# Patient Record
Sex: FEMALE | Race: WHITE | HISPANIC OR LATINO | Employment: FULL TIME | URBAN - METROPOLITAN AREA
[De-identification: names, ages, dates, MRNs, and addresses within clinical notes are randomized per-mention and may not be internally consistent; named-entity substitution may affect disease eponyms.]

---

## 2017-04-17 ENCOUNTER — ALLSCRIPTS OFFICE VISIT (OUTPATIENT)
Dept: OTHER | Facility: OTHER | Age: 47
End: 2017-04-17

## 2017-04-17 ENCOUNTER — LAB REQUISITION (OUTPATIENT)
Dept: LAB | Facility: HOSPITAL | Age: 47
End: 2017-04-17
Payer: COMMERCIAL

## 2017-04-17 ENCOUNTER — GENERIC CONVERSION - ENCOUNTER (OUTPATIENT)
Dept: OTHER | Facility: OTHER | Age: 47
End: 2017-04-17

## 2017-04-17 DIAGNOSIS — N92.1 EXCESSIVE AND FREQUENT MENSTRUATION WITH IRREGULAR CYCLE: ICD-10-CM

## 2017-04-17 LAB — HCG, QUALITATIVE (HISTORICAL): NEGATIVE

## 2017-04-17 PROCEDURE — 88305 TISSUE EXAM BY PATHOLOGIST: CPT | Performed by: OBSTETRICS & GYNECOLOGY

## 2017-05-02 ENCOUNTER — ANESTHESIA EVENT (OUTPATIENT)
Dept: PERIOP | Facility: HOSPITAL | Age: 47
End: 2017-05-02
Payer: COMMERCIAL

## 2017-05-03 ENCOUNTER — ANESTHESIA (OUTPATIENT)
Dept: PERIOP | Facility: HOSPITAL | Age: 47
End: 2017-05-03
Payer: COMMERCIAL

## 2017-05-03 ENCOUNTER — HOSPITAL ENCOUNTER (OUTPATIENT)
Facility: HOSPITAL | Age: 47
Setting detail: OUTPATIENT SURGERY
Discharge: HOME | End: 2017-05-03
Attending: OBSTETRICS & GYNECOLOGY | Admitting: OBSTETRICS & GYNECOLOGY
Payer: COMMERCIAL

## 2017-05-03 VITALS
HEART RATE: 72 BPM | TEMPERATURE: 98.6 F | RESPIRATION RATE: 18 BRPM | HEIGHT: 63 IN | OXYGEN SATURATION: 97 % | SYSTOLIC BLOOD PRESSURE: 131 MMHG | WEIGHT: 270 LBS | BODY MASS INDEX: 47.84 KG/M2 | DIASTOLIC BLOOD PRESSURE: 76 MMHG

## 2017-05-03 DIAGNOSIS — N92.6 IRREGULAR MENSTRUATION: ICD-10-CM

## 2017-05-03 DIAGNOSIS — N84.0 POLYP OF CORPUS UTERI: ICD-10-CM

## 2017-05-03 LAB
ANION GAP SERPL CALCULATED.3IONS-SCNC: 5 MMOL/L (ref 4–13)
BUN SERPL-MCNC: 8 MG/DL (ref 5–25)
CALCIUM SERPL-MCNC: 8.9 MG/DL (ref 8.3–10.1)
CHLORIDE SERPL-SCNC: 105 MMOL/L (ref 100–108)
CO2 SERPL-SCNC: 30 MMOL/L (ref 21–32)
CREAT SERPL-MCNC: 0.83 MG/DL (ref 0.6–1.3)
EXT PREGNANCY TEST URINE: NEGATIVE
GFR SERPL CREATININE-BSD FRML MDRD: >60 ML/MIN/1.73SQ M
GLUCOSE P FAST SERPL-MCNC: 96 MG/DL (ref 65–99)
GLUCOSE SERPL-MCNC: 96 MG/DL (ref 65–140)
HCT VFR BLD AUTO: 37.8 % (ref 34.8–46.1)
POTASSIUM SERPL-SCNC: 4.2 MMOL/L (ref 3.5–5.3)
SODIUM SERPL-SCNC: 140 MMOL/L (ref 136–145)
TSH SERPL DL<=0.05 MIU/L-ACNC: 1.24 UIU/ML (ref 0.36–3.74)

## 2017-05-03 PROCEDURE — 80048 BASIC METABOLIC PNL TOTAL CA: CPT | Performed by: OBSTETRICS & GYNECOLOGY

## 2017-05-03 PROCEDURE — 88305 TISSUE EXAM BY PATHOLOGIST: CPT | Performed by: OBSTETRICS & GYNECOLOGY

## 2017-05-03 PROCEDURE — 85014 HEMATOCRIT: CPT | Performed by: OBSTETRICS & GYNECOLOGY

## 2017-05-03 PROCEDURE — 81025 URINE PREGNANCY TEST: CPT | Performed by: ANESTHESIOLOGY

## 2017-05-03 PROCEDURE — 84443 ASSAY THYROID STIM HORMONE: CPT | Performed by: OBSTETRICS & GYNECOLOGY

## 2017-05-03 PROCEDURE — A9270 NON-COVERED ITEM OR SERVICE: HCPCS | Performed by: OBSTETRICS & GYNECOLOGY

## 2017-05-03 RX ORDER — KETOROLAC TROMETHAMINE 30 MG/ML
INJECTION, SOLUTION INTRAMUSCULAR; INTRAVENOUS AS NEEDED
Status: DISCONTINUED | OUTPATIENT
Start: 2017-05-03 | End: 2017-05-03 | Stop reason: SURG

## 2017-05-03 RX ORDER — NORETHINDRONE ACETATE AND ETHINYL ESTRADIOL 1; 5 MG/1; UG/1
1 TABLET ORAL DAILY
COMMUNITY
End: 2018-06-18 | Stop reason: CLARIF

## 2017-05-03 RX ORDER — IBUPROFEN 600 MG/1
600 TABLET ORAL EVERY 6 HOURS PRN
Status: DISCONTINUED | OUTPATIENT
Start: 2017-05-03 | End: 2017-05-03 | Stop reason: HOSPADM

## 2017-05-03 RX ORDER — LORAZEPAM 2 MG/ML
0.5 INJECTION INTRAMUSCULAR ONCE
Status: COMPLETED | OUTPATIENT
Start: 2017-05-03 | End: 2017-05-03

## 2017-05-03 RX ORDER — LORAZEPAM 0.5 MG/1
0.5 TABLET ORAL EVERY 6 HOURS PRN
COMMUNITY

## 2017-05-03 RX ORDER — SODIUM CHLORIDE 9 MG/ML
125 INJECTION, SOLUTION INTRAVENOUS CONTINUOUS
Status: DISCONTINUED | OUTPATIENT
Start: 2017-05-03 | End: 2017-05-03 | Stop reason: HOSPADM

## 2017-05-03 RX ORDER — LIDOCAINE HYDROCHLORIDE 10 MG/ML
INJECTION, SOLUTION INFILTRATION; PERINEURAL AS NEEDED
Status: DISCONTINUED | OUTPATIENT
Start: 2017-05-03 | End: 2017-05-03 | Stop reason: SURG

## 2017-05-03 RX ORDER — OXYCODONE HYDROCHLORIDE AND ACETAMINOPHEN 5; 325 MG/1; MG/1
2 TABLET ORAL EVERY 4 HOURS PRN
Status: DISCONTINUED | OUTPATIENT
Start: 2017-05-03 | End: 2017-05-03 | Stop reason: HOSPADM

## 2017-05-03 RX ORDER — MIDAZOLAM HYDROCHLORIDE 1 MG/ML
INJECTION INTRAMUSCULAR; INTRAVENOUS AS NEEDED
Status: DISCONTINUED | OUTPATIENT
Start: 2017-05-03 | End: 2017-05-03 | Stop reason: SURG

## 2017-05-03 RX ORDER — PROPOFOL 10 MG/ML
INJECTION, EMULSION INTRAVENOUS AS NEEDED
Status: DISCONTINUED | OUTPATIENT
Start: 2017-05-03 | End: 2017-05-03 | Stop reason: SURG

## 2017-05-03 RX ORDER — FERRIC SUBSULFATE 0.21 G/G
LIQUID TOPICAL AS NEEDED
Status: DISCONTINUED | OUTPATIENT
Start: 2017-05-03 | End: 2017-05-03 | Stop reason: HOSPADM

## 2017-05-03 RX ORDER — LOSARTAN POTASSIUM 50 MG/1
100 TABLET ORAL DAILY
COMMUNITY

## 2017-05-03 RX ORDER — DEXAMETHASONE SODIUM PHOSPHATE 4 MG/ML
INJECTION, SOLUTION INTRA-ARTICULAR; INTRALESIONAL; INTRAMUSCULAR; INTRAVENOUS; SOFT TISSUE AS NEEDED
Status: DISCONTINUED | OUTPATIENT
Start: 2017-05-03 | End: 2017-05-03 | Stop reason: SURG

## 2017-05-03 RX ORDER — ONDANSETRON 2 MG/ML
4 INJECTION INTRAMUSCULAR; INTRAVENOUS EVERY 6 HOURS PRN
Status: DISCONTINUED | OUTPATIENT
Start: 2017-05-03 | End: 2017-05-03 | Stop reason: HOSPADM

## 2017-05-03 RX ORDER — FENTANYL CITRATE/PF 50 MCG/ML
25 SYRINGE (ML) INJECTION
Status: DISCONTINUED | OUTPATIENT
Start: 2017-05-03 | End: 2017-05-03 | Stop reason: HOSPADM

## 2017-05-03 RX ORDER — ONDANSETRON 2 MG/ML
4 INJECTION INTRAMUSCULAR; INTRAVENOUS ONCE
Status: DISCONTINUED | OUTPATIENT
Start: 2017-05-03 | End: 2017-05-03 | Stop reason: HOSPADM

## 2017-05-03 RX ORDER — ONDANSETRON 2 MG/ML
INJECTION INTRAMUSCULAR; INTRAVENOUS AS NEEDED
Status: DISCONTINUED | OUTPATIENT
Start: 2017-05-03 | End: 2017-05-03 | Stop reason: SURG

## 2017-05-03 RX ORDER — OXYCODONE HYDROCHLORIDE AND ACETAMINOPHEN 5; 325 MG/1; MG/1
1 TABLET ORAL EVERY 4 HOURS PRN
Status: DISCONTINUED | OUTPATIENT
Start: 2017-05-03 | End: 2017-05-03 | Stop reason: HOSPADM

## 2017-05-03 RX ORDER — FENTANYL CITRATE 50 UG/ML
INJECTION, SOLUTION INTRAMUSCULAR; INTRAVENOUS AS NEEDED
Status: DISCONTINUED | OUTPATIENT
Start: 2017-05-03 | End: 2017-05-03 | Stop reason: SURG

## 2017-05-03 RX ORDER — MELOXICAM 15 MG/1
15 TABLET ORAL DAILY
COMMUNITY

## 2017-05-03 RX ORDER — MAGNESIUM HYDROXIDE 1200 MG/15ML
LIQUID ORAL AS NEEDED
Status: DISCONTINUED | OUTPATIENT
Start: 2017-05-03 | End: 2017-05-03 | Stop reason: HOSPADM

## 2017-05-03 RX ADMIN — SODIUM CHLORIDE 125 ML/HR: 0.9 INJECTION, SOLUTION INTRAVENOUS at 15:38

## 2017-05-03 RX ADMIN — DEXAMETHASONE SODIUM PHOSPHATE 8 MG: 4 INJECTION, SOLUTION INTRAMUSCULAR; INTRAVENOUS at 14:34

## 2017-05-03 RX ADMIN — KETOROLAC TROMETHAMINE 30 MG: 30 INJECTION, SOLUTION INTRAMUSCULAR at 14:57

## 2017-05-03 RX ADMIN — MIDAZOLAM HYDROCHLORIDE 2 MG: 1 INJECTION, SOLUTION INTRAMUSCULAR; INTRAVENOUS at 14:19

## 2017-05-03 RX ADMIN — ONDANSETRON HYDROCHLORIDE 8 MG: 2 INJECTION, SOLUTION INTRAVENOUS at 14:34

## 2017-05-03 RX ADMIN — PROPOFOL 200 MG: 10 INJECTION, EMULSION INTRAVENOUS at 14:21

## 2017-05-03 RX ADMIN — LIDOCAINE HYDROCHLORIDE 60 MG: 10 INJECTION, SOLUTION INFILTRATION; PERINEURAL at 14:21

## 2017-05-03 RX ADMIN — LORAZEPAM 0.5 MG: 2 INJECTION INTRAMUSCULAR; INTRAVENOUS at 15:29

## 2017-05-03 RX ADMIN — SODIUM CHLORIDE 125 ML/HR: 0.9 INJECTION, SOLUTION INTRAVENOUS at 13:58

## 2017-05-03 RX ADMIN — FENTANYL CITRATE 50 MCG: 50 INJECTION, SOLUTION INTRAMUSCULAR; INTRAVENOUS at 14:21

## 2017-05-03 RX ADMIN — FENTANYL CITRATE 50 MCG: 50 INJECTION, SOLUTION INTRAMUSCULAR; INTRAVENOUS at 14:38

## 2017-05-05 ENCOUNTER — GENERIC CONVERSION - ENCOUNTER (OUTPATIENT)
Dept: OTHER | Facility: OTHER | Age: 47
End: 2017-05-05

## 2017-05-05 PROBLEM — N92.6 IRREGULAR MENSES: Status: ACTIVE | Noted: 2017-05-05

## 2017-05-05 PROBLEM — N84.0 ENDOMETRIAL POLYP: Status: ACTIVE | Noted: 2017-05-05

## 2017-05-09 ENCOUNTER — GENERIC CONVERSION - ENCOUNTER (OUTPATIENT)
Dept: OTHER | Facility: OTHER | Age: 47
End: 2017-05-09

## 2017-05-16 ENCOUNTER — ALLSCRIPTS OFFICE VISIT (OUTPATIENT)
Dept: OTHER | Facility: OTHER | Age: 47
End: 2017-05-16

## 2017-05-16 PROCEDURE — G0145 SCR C/V CYTO,THINLAYER,RESCR: HCPCS | Performed by: OBSTETRICS & GYNECOLOGY

## 2017-05-16 PROCEDURE — 87624 HPV HI-RISK TYP POOLED RSLT: CPT | Performed by: OBSTETRICS & GYNECOLOGY

## 2017-05-17 ENCOUNTER — LAB REQUISITION (OUTPATIENT)
Dept: LAB | Facility: HOSPITAL | Age: 47
End: 2017-05-17
Payer: COMMERCIAL

## 2017-05-17 DIAGNOSIS — Z11.51 ENCOUNTER FOR SCREENING FOR HUMAN PAPILLOMAVIRUS (HPV): ICD-10-CM

## 2017-05-17 DIAGNOSIS — Z12.4 ENCOUNTER FOR SCREENING FOR MALIGNANT NEOPLASM OF CERVIX: ICD-10-CM

## 2017-05-19 LAB — HPV RRNA GENITAL QL NAA+PROBE: NORMAL

## 2017-05-24 LAB
LAB AP GYN PRIMARY INTERPRETATION: NORMAL
Lab: NORMAL

## 2018-01-09 NOTE — MISCELLANEOUS
Message   Date: 15 Cristiano 2016 12:23 PM EST, Recorded By: Maria Del Carmen Dukes   Calling For: Kang Castillo: Antwan Ross, Self   Phone: [phone] (Home), [phone] (Work)   Reason: Medical Complaint   Patient called to report LMP mid November, scant, dark, and none since  Taking progesterone only pill with compliance  Advised WNL with this pill, check HPT, keep calender, call back if problem  Active Problems    1  Breast self examination education, encounter for (V65 49) (Z71 89)   2  Encounter for gynecological examination (V72 31) (Z01 419)   3  Encounter for screening mammogram for malignant neoplasm of breast (V76 12)   (Z12 31)   4  Endometrial polyp (621 0) (N84 0)   5  Denied: History of self breast exam   6  Leiomyoma of uterus (218 9) (D25 9)   7  Oral contraceptive prescribed (V25 01) (Z30 011)    Current Meds   1  Lisinopril 20 MG Oral Tablet; Therapy: (Recorded:13Oct2015) to Recorded   2  Norethindrone 0 35 MG Oral Tablet; One po daily; Therapy: [id number] to (Last Rx:13Oct2015)  Requested for: 13Oct2015 Ordered   3  Zyrtec TABS; Therapy: (Recorded:13Oct2015) to Recorded    Allergies    1   No Known Drug Allergies    Signatures   Electronically signed by : Natty Lopez, ; Cristiano 15 2016 12:25PM EST                       (Author)

## 2018-01-12 NOTE — MISCELLANEOUS
Message  Return to work or school:   Bishop Mathews is under my professional care  She was seen in my office on 4/17/2017       Moraima Tamayo is scheduled for a surgical procedure on May 3, 2017 and will be unable to work on May 3, 2017 and May 4, 2017     Jeovany Samaniego MD       Signatures   Electronically signed by : Prabhu Soriano, ; Apr 17 2017 12:19PM EST                       (Author)

## 2018-01-13 VITALS
DIASTOLIC BLOOD PRESSURE: 70 MMHG | WEIGHT: 270.25 LBS | HEIGHT: 63 IN | BODY MASS INDEX: 47.88 KG/M2 | SYSTOLIC BLOOD PRESSURE: 118 MMHG

## 2018-01-13 VITALS
HEIGHT: 63 IN | SYSTOLIC BLOOD PRESSURE: 120 MMHG | DIASTOLIC BLOOD PRESSURE: 76 MMHG | BODY MASS INDEX: 48.26 KG/M2 | WEIGHT: 272.38 LBS

## 2018-01-15 NOTE — MISCELLANEOUS
Message   Recorded as Task   Date: 05/04/2017 11:53 AM, Created By: Amie Bhandari   Task Name: Follow Up   Assigned To: Mindy Arambula   Regarding Patient: Scott Dye, Status: Active   CommentCindystanley Johnson - 04 May 2017 11:53 AM     TASK CREATED  Called patient for post op check  States feeling well, no pain, no bleeding, no fever  Advised rest, call back if any problems  Brad Orozco - 04 May 2017 4:04 PM     TASK REPLIED TO: Previously Assigned To [employer], thanks        Active Problems    1  Adenomyosis (617 0) (N80 0)   2  Breast self examination education, encounter for (V65 49) (Z71 89)   3  Cervical cancer screening (V76 2) (Z12 4)   4  Encounter for routine gynecological examination with Papanicolaou smear of cervix   (V72 31,V76 2) (Z01 419)   5  Encounter for screening mammogram for malignant neoplasm of breast (V76 12)   (Z12 31)   6  Endometrial polyp (621 0) (N84 0)   7  Metrorrhagia (626 6) (N92 1)   8  Oral contraceptive prescribed (V25 01) (Z30 011)   9  Screening for HPV (human papillomavirus) (V73 81) (Z11 51)    Current Meds   1  Lisinopril 20 MG Oral Tablet; Therapy: (Recorded:13Oct2015) to Recorded   2  Norethindrone 0 35 MG Oral Tablet; One po daily; Therapy: [id number] to (Evaluate:09Oct2017)  Requested for: [id number]; Last   Rx:07Nov2016 Ordered   3  Zyrtec TABS; Therapy: (Recorded:13Oct2015) to Recorded    Allergies    1   No Known Drug Allergies    Signatures   Electronically signed by : Ernesto Degroot, ; May  5 2017  8:19AM EST                       (Author)

## 2018-01-17 NOTE — MISCELLANEOUS
Message   Date: 09 May 2017 8:25 AM EST, Recorded By: Jan Yañez For: Dipika Romero: Keyla Coleman, Self   Phone: [phone] (Home), [phone] (Work)   Reason: Renew Medication   Patient called for BCP refill  Escript sent  Active Problems    1  Adenomyosis (617 0) (N80 0)   2  Breast self examination education, encounter for (V65 49) (Z71 89)   3  Cervical cancer screening (V76 2) (Z12 4)   4  Encounter for routine gynecological examination with Papanicolaou smear of cervix   (V72 31,V76 2) (Z01 419)   5  Encounter for screening mammogram for malignant neoplasm of breast (V76 12)   (Z12 31)   6  Endometrial polyp (621 0) (N84 0)   7  Metrorrhagia (626 6) (N92 1)   8  Oral contraceptive prescribed (V25 01) (Z30 011)   9  Screening for HPV (human papillomavirus) (V73 81) (Z11 51)    Current Meds   1  Lisinopril 20 MG Oral Tablet; Therapy: (Recorded:13Oct2015) to Recorded   2  Norethindrone 0 35 MG Oral Tablet; One po daily; Therapy: [id number] to (Evaluate:09Oct2017)  Requested for: 28UIP1728; Last   Rx:07Nov2016 Ordered   3  Zyrtec TABS; Therapy: (Recorded:13Oct2015) to Recorded    Allergies    1   No Known Drug Allergies    Plan  Oral contraceptive prescribed    · From  Norethindrone 0 35 MG Oral Tablet One po daily To Norethindrone 0 35  MG Oral Tablet take 1 tablet by mouth every day    Signatures   Electronically signed by : Dakota Dinh, ; May  9 2017  8:25AM EST                       (Author)

## 2018-02-05 DIAGNOSIS — Z30.41 ENCOUNTER FOR SURVEILLANCE OF CONTRACEPTIVE PILLS: Primary | ICD-10-CM

## 2018-02-06 RX ORDER — ACETAMINOPHEN AND CODEINE PHOSPHATE 120; 12 MG/5ML; MG/5ML
1 SOLUTION ORAL DAILY
Qty: 28 TABLET | Refills: 1 | Status: SHIPPED | OUTPATIENT
Start: 2018-02-06 | End: 2018-03-05 | Stop reason: SDUPTHER

## 2018-02-16 ENCOUNTER — HOSPITAL ENCOUNTER (OUTPATIENT)
Dept: MAMMOGRAPHY | Facility: HOSPITAL | Age: 48
Discharge: HOME | End: 2018-02-16
Attending: OBSTETRICS & GYNECOLOGY
Payer: COMMERCIAL

## 2018-02-16 DIAGNOSIS — Z12.31 ENCOUNTER FOR SCREENING MAMMOGRAM FOR MALIGNANT NEOPLASM OF BREAST: ICD-10-CM

## 2018-02-16 PROCEDURE — 77067 SCR MAMMO BI INCL CAD: CPT

## 2018-03-05 DIAGNOSIS — Z30.41 ENCOUNTER FOR SURVEILLANCE OF CONTRACEPTIVE PILLS: ICD-10-CM

## 2018-03-05 RX ORDER — ACETAMINOPHEN AND CODEINE PHOSPHATE 120; 12 MG/5ML; MG/5ML
1 SOLUTION ORAL DAILY
Qty: 28 TABLET | Refills: 0 | Status: SHIPPED | OUTPATIENT
Start: 2018-03-05 | End: 2018-04-02 | Stop reason: SDUPTHER

## 2018-04-02 DIAGNOSIS — Z30.41 ENCOUNTER FOR SURVEILLANCE OF CONTRACEPTIVE PILLS: ICD-10-CM

## 2018-04-02 RX ORDER — ACETAMINOPHEN AND CODEINE PHOSPHATE 120; 12 MG/5ML; MG/5ML
1 SOLUTION ORAL DAILY
Qty: 28 TABLET | Refills: 0 | Status: SHIPPED | OUTPATIENT
Start: 2018-04-02 | End: 2018-05-02 | Stop reason: SDUPTHER

## 2018-05-02 DIAGNOSIS — Z30.41 ENCOUNTER FOR SURVEILLANCE OF CONTRACEPTIVE PILLS: ICD-10-CM

## 2018-05-02 RX ORDER — ACETAMINOPHEN AND CODEINE PHOSPHATE 120; 12 MG/5ML; MG/5ML
1 SOLUTION ORAL DAILY
Qty: 28 TABLET | Refills: 1 | Status: SHIPPED | OUTPATIENT
Start: 2018-05-02 | End: 2018-05-31 | Stop reason: SDUPTHER

## 2018-05-31 DIAGNOSIS — Z30.41 ENCOUNTER FOR SURVEILLANCE OF CONTRACEPTIVE PILLS: ICD-10-CM

## 2018-06-01 RX ORDER — ACETAMINOPHEN AND CODEINE PHOSPHATE 120; 12 MG/5ML; MG/5ML
1 SOLUTION ORAL DAILY
Qty: 28 TABLET | Refills: 0 | Status: SHIPPED | OUTPATIENT
Start: 2018-06-01 | End: 2018-07-27

## 2018-06-18 ENCOUNTER — ANNUAL EXAM (OUTPATIENT)
Dept: OBGYN CLINIC | Facility: CLINIC | Age: 48
End: 2018-06-18
Payer: COMMERCIAL

## 2018-06-18 VITALS
SYSTOLIC BLOOD PRESSURE: 120 MMHG | BODY MASS INDEX: 51.81 KG/M2 | HEIGHT: 63 IN | WEIGHT: 292.4 LBS | DIASTOLIC BLOOD PRESSURE: 80 MMHG

## 2018-06-18 DIAGNOSIS — Z12.31 VISIT FOR SCREENING MAMMOGRAM: ICD-10-CM

## 2018-06-18 DIAGNOSIS — Z01.419 WOMEN'S ANNUAL ROUTINE GYNECOLOGICAL EXAMINATION: Primary | ICD-10-CM

## 2018-06-18 PROCEDURE — 99396 PREV VISIT EST AGE 40-64: CPT | Performed by: OBSTETRICS & GYNECOLOGY

## 2018-06-18 RX ORDER — LISINOPRIL 20 MG/1
TABLET ORAL
COMMUNITY

## 2018-06-18 RX ORDER — ESCITALOPRAM OXALATE 5 MG/1
5 TABLET ORAL
COMMUNITY
Start: 2018-05-22 | End: 2022-01-31 | Stop reason: ALTCHOICE

## 2018-06-18 RX ORDER — ACETAMINOPHEN AND CODEINE PHOSPHATE 120; 12 MG/5ML; MG/5ML
1 SOLUTION ORAL
COMMUNITY
Start: 2015-02-04 | End: 2020-08-31 | Stop reason: ALTCHOICE

## 2018-06-18 RX ORDER — CETIRIZINE HYDROCHLORIDE 10 MG/1
TABLET ORAL
COMMUNITY

## 2018-06-18 NOTE — PROGRESS NOTES
Assessment/Plan:  1  Yearly exam-Pap smear deferred, self-breast awareness reviewed, calcium/vitamin-D recommendations discussed, mammogram request given  2  History of menometrorrhagia-resolved after hysteroscopy with D&C polypectomy May 2017  She will call with any recurrence of symptoms  3   Uterine enlargement/right lower quadrant discomfort history-previous exam notable for uterus 6-8 week size on exam   Today, uterus appears to be top-normal size without tenderness in the adnexa noted  Patient denies any symptoms  4   Contraception-patient will continue on mini pill  Electronic prescription for 3 packs refill 3 was sent a local pharmacy  5   Blood pressure-blood pressure today 120/80  She will continue follow up with her treating doctor  6   Other-patient with obesity  Diet and exercise recommended  She will follow-up in 1 year or as needed  No problem-specific Assessment & Plan notes found for this encounter  Diagnoses and all orders for this visit:    Women's annual routine gynecological examination    Visit for screening mammogram  -     Mammo screening bilateral w cad; Future    Other orders  -     lisinopril (ZESTRIL) 20 mg tablet; Take by mouth  -     cetirizine (ZYRTEC ALLERGY) 10 mg tablet; Take by mouth  -     norethindrone (SHAROBEL) 0 35 MG tablet; Take 1 tablet by mouth  -     escitalopram (LEXAPRO) 5 mg tablet; Take 5 mg by mouth          Subjective:      Patient ID: Kinsey Smith is a 52 y o  female  Patient was seen today for yearly exam   Please see assessment plan for details  The following portions of the patient's history were reviewed and updated as appropriate: allergies, current medications, past family history, past medical history, past social history, past surgical history and problem list     Review of Systems   Constitutional: Negative for chills, diaphoresis, fatigue and fever     Respiratory: Negative for apnea, cough, chest tightness, shortness of breath and wheezing  Cardiovascular: Negative for chest pain, palpitations and leg swelling  Gastrointestinal: Negative for abdominal distention, abdominal pain, anal bleeding, constipation, diarrhea, nausea, rectal pain and vomiting  Genitourinary: Negative for difficulty urinating, dyspareunia, dysuria, frequency, hematuria, menstrual problem, pelvic pain, urgency, vaginal bleeding, vaginal discharge and vaginal pain  Musculoskeletal: Negative for arthralgias, back pain and myalgias  Skin: Negative for color change and rash  Neurological: Negative for dizziness, syncope, light-headedness, numbness and headaches  Hematological: Negative for adenopathy  Does not bruise/bleed easily  Psychiatric/Behavioral: Negative for dysphoric mood and sleep disturbance  The patient is not nervous/anxious  Objective:      /80 (BP Location: Right arm, Patient Position: Sitting, Cuff Size: Standard)   Ht 5' 3" (1 6 m)   Wt 133 kg (292 lb 6 4 oz)   LMP 06/03/2018 (Exact Date)   BMI 51 80 kg/m²          Physical Exam    Objective      /80 (BP Location: Right arm, Patient Position: Sitting, Cuff Size: Standard)   Ht 5' 3" (1 6 m)   Wt 133 kg (292 lb 6 4 oz)   LMP 06/03/2018 (Exact Date)   BMI 51 80 kg/m²     General:   alert and oriented, in no acute distress, alert, appears stated age and cooperative   Neck: normal to inspection and palpation   Breast: normal appearance, no masses or tenderness   Heart:    Lungs:    Abdomen: soft, non-tender, without masses or organomegaly   Vulva: normal   Vagina: Without erythema or lesionsOr discharge  Normal   Cervix: Without lesions or discharge or cervicitis  No Cervical motion tenderness and normal   Uterus: top normal size, anteverted, non-tender   Adnexa: no mass, fullness, tenderness   Rectum: Deferred, internal exam declined by patient    external hemorrhoids noted

## 2018-06-26 DIAGNOSIS — Z30.41 ENCOUNTER FOR SURVEILLANCE OF CONTRACEPTIVE PILLS: ICD-10-CM

## 2018-06-27 DIAGNOSIS — Z30.41 ENCOUNTER FOR SURVEILLANCE OF CONTRACEPTIVE PILLS: Primary | ICD-10-CM

## 2018-06-27 RX ORDER — ACETAMINOPHEN AND CODEINE PHOSPHATE 120; 12 MG/5ML; MG/5ML
1 SOLUTION ORAL DAILY
Qty: 84 TABLET | Refills: 3 | Status: SHIPPED | OUTPATIENT
Start: 2018-06-27 | End: 2019-08-06 | Stop reason: SDUPTHER

## 2018-06-29 RX ORDER — NORETHINDRONE 0.35 MG
KIT ORAL
Qty: 28 TABLET | Refills: 0 | OUTPATIENT
Start: 2018-06-29

## 2019-04-18 ENCOUNTER — TELEPHONE (OUTPATIENT)
Dept: OBGYN CLINIC | Facility: CLINIC | Age: 49
End: 2019-04-18

## 2019-07-03 ENCOUNTER — HOSPITAL ENCOUNTER (OUTPATIENT)
Dept: MAMMOGRAPHY | Facility: HOSPITAL | Age: 49
Discharge: HOME | End: 2019-07-03
Attending: OBSTETRICS & GYNECOLOGY
Payer: COMMERCIAL

## 2019-07-03 VITALS — BODY MASS INDEX: 51.74 KG/M2 | WEIGHT: 292 LBS | HEIGHT: 63 IN

## 2019-07-03 DIAGNOSIS — Z12.31 VISIT FOR SCREENING MAMMOGRAM: ICD-10-CM

## 2019-07-03 PROCEDURE — 77067 SCR MAMMO BI INCL CAD: CPT

## 2019-07-17 ENCOUNTER — TELEPHONE (OUTPATIENT)
Dept: OBGYN CLINIC | Facility: CLINIC | Age: 49
End: 2019-07-17

## 2019-08-06 DIAGNOSIS — Z30.41 ENCOUNTER FOR SURVEILLANCE OF CONTRACEPTIVE PILLS: ICD-10-CM

## 2019-08-06 RX ORDER — ACETAMINOPHEN AND CODEINE PHOSPHATE 120; 12 MG/5ML; MG/5ML
1 SOLUTION ORAL DAILY
Qty: 28 TABLET | Refills: 1 | Status: SHIPPED | OUTPATIENT
Start: 2019-08-06 | End: 2020-08-31 | Stop reason: SDUPTHER

## 2019-08-06 NOTE — TELEPHONE ENCOUNTER
[facility] sent fax for refills for patient's norethindrone and patient did not answer the house phone nor cell phone  Patient needs to reschedule yearly appointment for medication

## 2020-08-31 ENCOUNTER — ANNUAL EXAM (OUTPATIENT)
Dept: OBGYN CLINIC | Facility: CLINIC | Age: 50
End: 2020-08-31
Payer: COMMERCIAL

## 2020-08-31 VITALS
SYSTOLIC BLOOD PRESSURE: 142 MMHG | BODY MASS INDEX: 48.71 KG/M2 | DIASTOLIC BLOOD PRESSURE: 86 MMHG | TEMPERATURE: 97.8 F | WEIGHT: 275 LBS

## 2020-08-31 DIAGNOSIS — Z12.4 SCREENING FOR CERVICAL CANCER: ICD-10-CM

## 2020-08-31 DIAGNOSIS — K64.4 EXTERNAL HEMORRHOID: ICD-10-CM

## 2020-08-31 DIAGNOSIS — Z30.41 ENCOUNTER FOR SURVEILLANCE OF CONTRACEPTIVE PILLS: ICD-10-CM

## 2020-08-31 DIAGNOSIS — Z12.31 ENCOUNTER FOR SCREENING MAMMOGRAM FOR MALIGNANT NEOPLASM OF BREAST: ICD-10-CM

## 2020-08-31 DIAGNOSIS — Z11.51 SCREENING FOR HPV (HUMAN PAPILLOMAVIRUS): ICD-10-CM

## 2020-08-31 DIAGNOSIS — Z01.419 WOMEN'S ANNUAL ROUTINE GYNECOLOGICAL EXAMINATION: Primary | ICD-10-CM

## 2020-08-31 PROCEDURE — S0612 ANNUAL GYNECOLOGICAL EXAMINA: HCPCS | Performed by: OBSTETRICS & GYNECOLOGY

## 2020-08-31 PROCEDURE — 87624 HPV HI-RISK TYP POOLED RSLT: CPT | Performed by: OBSTETRICS & GYNECOLOGY

## 2020-08-31 PROCEDURE — G0145 SCR C/V CYTO,THINLAYER,RESCR: HCPCS | Performed by: OBSTETRICS & GYNECOLOGY

## 2020-08-31 RX ORDER — ACETAMINOPHEN AND CODEINE PHOSPHATE 120; 12 MG/5ML; MG/5ML
1 SOLUTION ORAL DAILY
Qty: 84 TABLET | Refills: 3 | Status: SHIPPED | OUTPATIENT
Start: 2020-08-31 | End: 2021-09-08

## 2020-08-31 NOTE — PROGRESS NOTES
Assessment/Plan   Diagnoses and all orders for this visit:    Women's annual routine gynecological examination    Encounter for screening mammogram for malignant neoplasm of breast  -     Mammo screening bilateral w cad; Future    Screening for cervical cancer  -     Liquid-based pap, screening    Screening for HPV (human papillomavirus)  -     Liquid-based pap, screening    Encounter for surveillance of contraceptive pills  -     norethindrone (MICRONOR) 0 35 MG tablet; Take 1 tablet (0 35 mg total) by mouth daily    External hemorrhoid  -     Ambulatory referral to Gastroenterology; Future    Other orders  -     metFORMIN (GLUCOPHAGE) 500 mg tablet; Take 500 mg by mouth 2 (two) times a day with meals    1  Yearly exam-Pap smear done with HPV testing, self-breast awareness reviewed, calcium/vitamin-D recommendations discussed, mammogram request given, colonoscopy recommended after age 48, referral to [address] [place] [address] given  2  History of menometrorrhagia-resolved after hysteroscopy with D&C and polypectomy May 2017  She has noted some alternating heavier menses with lighter menses but denies menometrorrhagia  She will call with any recurrence of symptoms  3  Previous history uterine enlargement/right lower quadrant discomfort-resolved over the past few visits  No intervention is recommended  4  Contraception-was off of OCP, mini pill but wishes to restart  Her  was on the night shift but now is switching to the day shift and she anticipates more sexual activity  She had no problems on the mini pill previously  This would be a good choice given her history of hypertension/diabetes  Electronic prescription for 3 packs refill 3 was sent a local pharmacy at her request   5   Hypertension-blood pressure 142/86 today  To follow-up with primary doctor  6  Other-diet/exercise recommended  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    My support was given  7  External hemorrhoid-patient declined internal rectal exam but this was visualized externally  It is on a thin stalk followed by about 1 cm hemorrhoid well external to the rectum  She noted this a few months back with episode of constipation  She has had lack of symptoms since eating a higher fiber diet with fruits/vegetables  Would recommend referral to GI and she was given referral to [address] [place] [address]  To follow-up 1 year for yearly exam or as needed  Subjective   Patient ID: Armin Aldana is a 52 y o  female  Vitals:    20 1248   BP: 142/86   Temp: 97 8 °F (36 6 °C)     Patient was seen today for yearly exam   Please see assessment plan for details        The following portions of the patient's history were reviewed and updated as appropriate: allergies, current medications, past family history, past medical history, past social history, past surgical history and problem list   Past Medical History:   Diagnosis Date    Abnormal Pap smear of cervix     Anemia     Arthritis     Decreased libido     Diabetes mellitus ([facility] [address] )     Female infertility     HPV (human papilloma virus) infection     Hypertension     Ovarian cyst     Polycystic ovarian syndrome     Urinary tract infection     Uterine leiomyoma     resolved 2017     Past Surgical History:   Procedure Laterality Date     SECTION      DILATION AND CURETTAGE OF UTERUS      ENDOMETRIAL BIOPSY      Onset  by suction    LAPAROSCOPIC GASTRIC BANDING      PA HYSTEROSCOPY,W/ENDO BX N/A 5/3/2017    Procedure: HYSTEROSCOPY; D&C; ENDOMETRIAL POLYPECTOMY ;  Surgeon: Stephanie Lewis MD;  Location: [facility];  Service: Gynecology     OB History    Para Term  AB Living   1 1 1         SAB TAB Ectopic Multiple Live Births                  # Outcome Date GA Lbr Jorje/2nd Weight Sex Delivery Anes PTL Lv   1 Term                Current Outpatient Medications:     escitalopram (LEXAPRO) 5 mg tablet, Take 5 mg by mouth, Disp: , Rfl:     LORazepam (ATIVAN) 0 5 mg tablet, Take 0 5 mg by mouth every 6 (six) hours as needed for anxiety, Disp: , Rfl:     losartan (COZAAR) 50 mg tablet, Take 50 mg by mouth daily, Disp: , Rfl:     meloxicam (MOBIC) 15 mg tablet, Take 15 mg by mouth daily, Disp: , Rfl:     metFORMIN (GLUCOPHAGE) 500 mg tablet, Take 500 mg by mouth 2 (two) times a day with meals, Disp: , Rfl:     cetirizine (ZYRTEC ALLERGY) 10 mg tablet, Take by mouth, Disp: , Rfl:     lisinopril (ZESTRIL) 20 mg tablet, Take by mouth, Disp: , Rfl:     norethindrone (MICRONOR) 0 35 MG tablet, Take 1 tablet (0 35 mg total) by mouth daily (Patient not taking: Reported on 8/31/2020), Disp: 28 tablet, Rfl: 1    norethindrone (SHAROBEL) 0 35 MG tablet, Take 1 tablet by mouth, Disp: , Rfl:   No Known Allergies  Social History     Socioeconomic History    Marital status: /Civil Union     Spouse name: None    Number of children: 1    Years of education: None    Highest education level: None   Occupational History    None   Social Needs    Financial resource strain: None    Food insecurity     Worry: None     Inability: None    Transportation needs     Medical: None     Non-medical: None   Tobacco Use    Smoking status: Never Smoker    Smokeless tobacco: Never Used   Substance and Sexual Activity    Alcohol use: No    Drug use: No    Sexual activity: Yes     Comment: oral contraceptive prescribe last assessed 05/16/2017   Lifestyle    Physical activity     Days per week: None     Minutes per session: None    Stress: None   Relationships    Social connections     Talks on phone: None     Gets together: None     Attends Congregational service: None     Active member of club or organization: None     Attends meetings of clubs or organizations: None     Relationship status: None    Intimate partner violence     Fear of current or ex partner: None     Emotionally abused: None     Physically abused: None     Forced sexual activity: None   Other Topics Concern    None   Social History Narrative    Always uses seat belt    Caffeine use    Hinduism affiliation     Family History   Problem Relation Age of Onset    Diabetes Mother     Hypertension Mother     Diabetes Father     Heart disease Father     Heart attack Paternal Grandmother     Heart attack Paternal Grandfather     Heart disease Other     Heart disease Other     No Known Problems Maternal Grandmother     No Known Problems Sister     No Known Problems Maternal Aunt     No Known Problems Maternal Aunt     No Known Problems Maternal Aunt     No Known Problems Maternal Aunt     No Known Problems Maternal Aunt     No Known Problems Maternal Aunt     Ovarian cancer Paternal Aunt 36    No Known Problems Paternal Aunt     No Known Problems Paternal Aunt     No Known Problems Paternal Aunt     No Known Problems Paternal Aunt     No Known Problems Paternal Aunt     No Known Problems Sister        Review of Systems   Constitutional: Negative for chills, diaphoresis, fatigue and fever  Respiratory: Negative for apnea, cough, chest tightness, shortness of breath and wheezing  Cardiovascular: Negative for chest pain, palpitations and leg swelling  Gastrointestinal: Negative for abdominal distention, abdominal pain, anal bleeding, constipation, diarrhea, nausea, rectal pain and vomiting  Genitourinary: Negative for difficulty urinating, dyspareunia, dysuria, frequency, hematuria, menstrual problem, pelvic pain, urgency, vaginal bleeding, vaginal discharge and vaginal pain  Musculoskeletal: Negative for arthralgias, back pain and myalgias  Skin: Negative for color change and rash  Neurological: Negative for dizziness, syncope, light-headedness, numbness and headaches  Hematological: Negative for adenopathy  Does not bruise/bleed easily  Psychiatric/Behavioral: Negative for dysphoric mood and sleep disturbance   The patient is not nervous/anxious  Objective   Physical Exam    Objective      /86 (BP Location: Left arm, Patient Position: Sitting, Cuff Size: Large)   Temp 97 8 °F (36 6 °C)   Wt 125 kg (275 lb)   LMP 08/10/2020 (Exact Date)   BMI 48 71 kg/m²     General:   alert and oriented, in no acute distress   Neck: normal to inspection and palpation   Breast: normal appearance, no masses or tenderness   Heart:    Lungs:    Abdomen: soft, non-tender, without masses or organomegaly   Vulva: normal   Vagina: Without erythema or lesions or discharge  Normal   Cervix: Without lesions or discharge or cervicitis  No Cervical motion tenderness   Uterus: top normal size, anteverted, non-tender   Adnexa: no mass, fullness, tenderness   Rectum: Deferred, patient declined    External hemorrhoid noted on visualization    Psych:  Normal mood and affect   Skin:  Without obvious lesions   Eyes: symmetric, with normal movements and reactivity   Musculoskeletal:  Normal muscle tone and movements appreciated

## 2020-08-31 NOTE — PATIENT INSTRUCTIONS
Hemorrhoids   WHAT YOU NEED TO KNOW:   What are hemorrhoids? Hemorrhoids are swollen blood vessels inside your rectum (internal hemorrhoids) or on your anus (external hemorrhoids)  Sometimes a hemorrhoid may prolapse  This means it extends out of your anus  What increases my risk for hemorrhoids? · Pregnancy or obesity    · Straining or sitting for a long time during bowel movements    · Liver disease    · Weak muscles around the anus caused by older age, rectal surgery, or anal intercourse    · A lack of physical activity    · Chronic diarrhea or constipation    · A low-fiber diet  What are the signs and symptoms of hemorrhoids? · Pain or itching around your anus or inside your rectum    · Swelling or bumps around your anus    · Bright red blood in your bowel movement, on the toilet paper, or in the toilet bowl    · Tissue bulging out of your anus (prolapsed hemorrhoids)    · Incontinence (poor control over urine or bowel movements)  How are hemorrhoids diagnosed? Your healthcare provider will ask about your symptoms, the foods you eat, and your bowel movements  He will examine your anus for external hemorrhoids  You may need the following:  · A digital rectal exam  is a test to check for hemorrhoids  Your healthcare provider will put a gloved finger inside your anus to feel for the hemorrhoids  · An anoscopy  is a test that uses a scope (small tube with a light and camera on the end) to look at your hemorrhoids  How are hemorrhoids treated? Treatment will depend on your symptoms  You may need any of the following:  · Medicines  can help decrease pain and swelling, and soften your bowel movement  The medicine may be a pill, pad, cream, or ointment  · Procedures  may be used to shrink or remove your hemorrhoid  Examples include rubber-band ligation, sclerotherapy, and photocoagulation  These procedures may be done in your healthcare provider's office   Ask your healthcare provider for more information about these procedures  · Surgery  may be needed to shrink or remove your hemorrhoids  How can I manage my symptoms? · Apply ice on your anus for 15 to 20 minutes every hour or as directed  Use an ice pack, or put crushed ice in a plastic bag  Cover it with a towel before you apply it to your anus  Ice helps prevent tissue damage and decreases swelling and pain  · Take a sitz bath  Fill a bathtub with 4 to 6 inches of warm water  You may also use a sitz bath pan that fits inside a toilet bowl  Sit in the sitz bath for 15 minutes  Do this 3 times a day, and after each bowel movement  The warm water can help decrease pain and swelling  · Keep your anal area clean  Gently wash the area with warm water daily  Soap may irritate the area  After a bowel movement, wipe with moist towelettes or wet toilet paper  Dry toilet paper can irritate the area  How can I help prevent hemorrhoids? · Do not strain to have a bowel movement  Do not sit on the toilet too long  These actions can increase pressure on the tissues in your rectum and anus  · Drink plenty of liquids  Liquids can help prevent constipation  Ask how much liquid to drink each day and which liquids are best for you  · Eat a variety of high-fiber foods  Examples include fruits, vegetables, and whole grains  Ask your healthcare provider how much fiber you need each day  You may need to take a fiber supplement  · Exercise as directed  Exercise, such as walking, may make it easier to have a bowel movement  Ask your healthcare provider to help you create an exercise plan  · Do not have anal sex  Anal sex can weaken the skin around your rectum and anus  · Avoid heavy lifting  This can cause straining and increase your risk for another hemorrhoid  When should I seek immediate care? · You have severe pain in your rectum or around your anus  · You have severe pain in your abdomen and you are vomiting  · You have bleeding from your anus that soaks through your underwear  When should I contact my healthcare provider? · You have frequent and painful bowel movements  · Your hemorrhoid looks or feels more swollen than usual      · You do not have a bowel movement for 2 days or more  · You see or feel tissue coming through your anus  · You have questions or concerns about your condition or care  CARE AGREEMENT:   You have the right to help plan your care  Learn about your health condition and how it may be treated  Discuss treatment options with your caregivers to decide what care you want to receive  You always have the right to refuse treatment  The above information is an  only  It is not intended as medical advice for individual conditions or treatments  Talk to your doctor, nurse or pharmacist before following any medical regimen to see if it is safe and effective for you  © 2017 [employer] [employer] Information is for End User's use only and may not be sold, redistributed or otherwise used for commercial purposes  All illustrations and images included in CareNotes® are the copyrighted property of A D A M , Inc  or [employer]  Norethindrone (By mouth)   Norethindrone (nor-ETH-in-drone)  Prevents pregnancy  Also treats menstrual problems and endometriosis  This medicine is commonly called a birth control pill  Brand Name(s): Aygestin, Kyleigh, Deblitane, Pia, ORSUDARSHAN, Craven, Jolivette, [place] Pack, Toño, Nor-QD, Dalia-BE, Norlyroc, Ortho Micronor, Sharobel   There may be other brand names for this medicine  When This Medicine Should Not Be Used: This medicine is not right for everyone  Do not use it if you had an allergic reaction to a progestin drug or if you are pregnant  Do not use it if you have liver disease, liver tumors, or a history of blood clots, stroke, heart attack, or breast cancer     How to Use This Medicine:   Tablet  · Your doctor will tell you how much medicine to use  Do not use more than directed  Different brands of birth control pills have different instructions for when to start  Ask your doctor or pharmacist if you do not understand what day to start taking your brand  · You may take this medicine with food or milk if it upsets your stomach  · Keep your pills in the container you receive from the pharmacy  Take the pills in the order they appear in the container  · Read and follow the patient instructions that come with this medicine  Talk to your doctor or pharmacist if you have any questions  · Take your pill at the same time every day, even during your menstrual period  · Take a dose as soon as you remember  If it is almost time for your next dose, wait until then and take a regular dose  Do not take extra medicine to make up for a missed dose  If you take a pill more than 3 hours late, use another form of birth control for the next 48 hours  · Store the pills in the original package, away from heat, moisture, and direct light  Drugs and Foods to Avoid:   Ask your doctor or pharmacist before using any other medicine, including over-the-counter medicines, vitamins, and herbal products  · Some foods and medicines can affect how birth control pills work  Tell your doctor if you are also taking any of the following:  ¨ Bromocriptine, rifampin, Shelby's wort, bosentan, griseofulvin  ¨ Antibiotic  ¨ Seizure medicine, including phenytoin, carbamazepine, felbamate, topiramate  ¨ Protease inhibitor that treats HIV/AIDS  ¨ Barbiturate (used to treat seizures, anxiety, or insomnia)  Warnings While Using This Medicine:   · Tell your doctor right away if you think you have become pregnant  If you miss two periods in a row, call your doctor for a pregnancy test before you take any more pills    · Tell your doctor if you are breastfeeding or if you have kidney disease, lupus, high blood pressure, high cholesterol, epilepsy, asthma, migraine headaches, diabetes, or a history of depression  · This medicine may cause the following problems:  ¨ Ectopic (tubal) pregnancy  ¨ Ovarian cysts that might twist or break  ¨ Possible risk of breast cancer  ¨ Benign liver tumor  ¨ Blood clots, which may lead to stroke or heart attack  · You might have spotting or irregular bleeding when you first start to use this medicine  You might have unplanned bleeding if you miss a dose or are late taking it  Call your doctor if you think there is a problem, such as if you have heavy bleeding  · This medicine will not protect you from HIV/AIDS or other sexually transmitted diseases  · Use a second form of birth control during the first 3 weeks to make sure you are protected from pregnancy  · Smoking increases your risk of heart attack, stroke, or blood clot while using this medicine  Talk to your doctor about these risks  · Tell any doctor or dentist who treats you that you are using this medicine  This medicine may affect certain medical test results  · Keep all medicine out of the reach of children  Never share your medicine with anyone    Possible Side Effects While Using This Medicine:   Call your doctor right away if you notice any of these side effects:  · Allergic reaction: Itching or hives, swelling in your face or hands, swelling or tingling in your mouth or throat, chest tightness, trouble breathing  · Chest pain, trouble breathing, or coughing up blood  · Numbness or weakness on one side of your body, sudden or severe headache, problems with vision, speech, or walking  · Pain in your lower abdomen  · Severe headache, sensitivity to light or sound, nausea or vomiting  · Trouble seeing, double vision, or other eye problems  · Yellow skin or eyes  If you notice these less serious side effects, talk with your doctor:   · Breast tenderness or swelling  · Headache  · Light spotting or bleeding between periods  · Nausea  If you notice other side effects that you think are caused by this medicine, tell your doctor  Call your doctor for medical advice about side effects  You may report side effects to FDA at 9-631-FDA-8529  © 2017 [address] [employer] Information is for End User's use only and may not be sold, redistributed or otherwise used for commercial purposes  The above information is an  only  It is not intended as medical advice for individual conditions or treatments  Talk to your doctor, nurse or pharmacist before following any medical regimen to see if it is safe and effective for you

## 2020-09-02 LAB
HPV HR 12 DNA CVX QL NAA+PROBE: NEGATIVE
HPV16 DNA CVX QL NAA+PROBE: NEGATIVE
HPV18 DNA CVX QL NAA+PROBE: NEGATIVE

## 2020-09-08 LAB
LAB AP GYN PRIMARY INTERPRETATION: NORMAL
Lab: NORMAL

## 2020-10-15 PROBLEM — K64.4 EXTERNAL HEMORRHOID: Status: ACTIVE | Noted: 2020-10-15

## 2021-01-06 ENCOUNTER — TRANSCRIBE ORDERS (OUTPATIENT)
Dept: ADMINISTRATIVE | Facility: HOSPITAL | Age: 51
End: 2021-01-06

## 2021-01-06 DIAGNOSIS — Z12.31 OTHER SCREENING MAMMOGRAM: Primary | ICD-10-CM

## 2021-03-23 ENCOUNTER — TELEPHONE (OUTPATIENT)
Dept: OBGYN CLINIC | Facility: CLINIC | Age: 51
End: 2021-03-23

## 2021-03-23 NOTE — TELEPHONE ENCOUNTER
Patient had J&J  COVID vaccine on 3/13/21  Has mammogram appointment on 4/1/21  Advised needs to reschedule mammogram for 4-6 weeks after vaccine

## 2021-08-04 ENCOUNTER — HOSPITAL ENCOUNTER (OUTPATIENT)
Dept: MAMMOGRAPHY | Facility: MEDICAL CENTER | Age: 51
Discharge: HOME | End: 2021-08-04
Payer: COMMERCIAL

## 2021-08-04 VITALS — WEIGHT: 272 LBS | HEIGHT: 62 IN | BODY MASS INDEX: 50.05 KG/M2

## 2021-08-04 DIAGNOSIS — Z12.31 OTHER SCREENING MAMMOGRAM: ICD-10-CM

## 2021-08-04 PROCEDURE — 77067 SCR MAMMO BI INCL CAD: CPT

## 2021-08-04 PROCEDURE — 77063 BREAST TOMOSYNTHESIS BI: CPT

## 2021-09-24 ENCOUNTER — TELEPHONE (OUTPATIENT)
Dept: GASTROENTEROLOGY | Facility: HOSPITAL | Age: 51
End: 2021-09-24

## 2021-09-26 ENCOUNTER — ANESTHESIA EVENT (OUTPATIENT)
Dept: ANESTHESIOLOGY | Facility: HOSPITAL | Age: 51
End: 2021-09-26

## 2021-09-26 ENCOUNTER — ANESTHESIA (OUTPATIENT)
Dept: ANESTHESIOLOGY | Facility: HOSPITAL | Age: 51
End: 2021-09-26

## 2021-09-26 NOTE — ANESTHESIA PREPROCEDURE EVALUATION
Procedure:  PRE-OP ONLY    Relevant Problems   No relevant active problems      Lab Results   Component Value Date    HCT 37 8 05/03/2017     Lab Results   Component Value Date    SODIUM 140 05/03/2017    K 4 2 05/03/2017     05/03/2017    CO2 30 05/03/2017    BUN 8 05/03/2017    CREATININE 0 83 05/03/2017    GLUC 96 05/03/2017    CALCIUM 8 9 05/03/2017     No results found for: INR, PROTIME  Lab Results   Component Value Date    HGBA1C 7 0 (H) 07/27/2020                 Anesthesia Plan  ASA Score- 2     Anesthesia Type- IV sedation with anesthesia with ASA Monitors  Additional Monitors:   Airway Plan:           Plan Factors-    Chart reviewed  Patient summary reviewed  Induction- intravenous      Postoperative Plan-     Informed Consent-

## 2021-09-27 ENCOUNTER — HOSPITAL ENCOUNTER (OUTPATIENT)
Dept: GASTROENTEROLOGY | Facility: HOSPITAL | Age: 51
Setting detail: OUTPATIENT SURGERY
Discharge: HOME | End: 2021-09-27
Attending: COLON & RECTAL SURGERY
Payer: COMMERCIAL

## 2021-09-27 ENCOUNTER — ANESTHESIA EVENT (OUTPATIENT)
Dept: GASTROENTEROLOGY | Facility: HOSPITAL | Age: 51
End: 2021-09-27

## 2021-09-27 ENCOUNTER — ANESTHESIA (OUTPATIENT)
Dept: GASTROENTEROLOGY | Facility: HOSPITAL | Age: 51
End: 2021-09-27

## 2021-09-27 VITALS
BODY MASS INDEX: 49.69 KG/M2 | HEART RATE: 77 BPM | SYSTOLIC BLOOD PRESSURE: 150 MMHG | WEIGHT: 270 LBS | RESPIRATION RATE: 16 BRPM | DIASTOLIC BLOOD PRESSURE: 65 MMHG | OXYGEN SATURATION: 100 % | HEIGHT: 62 IN | TEMPERATURE: 98.1 F

## 2021-09-27 DIAGNOSIS — Z12.11 SCREEN FOR COLON CANCER: ICD-10-CM

## 2021-09-27 PROCEDURE — G0121 COLON CA SCRN NOT HI RSK IND: HCPCS | Performed by: COLON & RECTAL SURGERY

## 2021-09-27 RX ORDER — SODIUM CHLORIDE 9 MG/ML
INJECTION, SOLUTION INTRAVENOUS CONTINUOUS PRN
Status: DISCONTINUED | OUTPATIENT
Start: 2021-09-27 | End: 2021-09-27

## 2021-09-27 RX ORDER — PROPOFOL 10 MG/ML
INJECTION, EMULSION INTRAVENOUS AS NEEDED
Status: DISCONTINUED | OUTPATIENT
Start: 2021-09-27 | End: 2021-09-27

## 2021-09-27 RX ORDER — LIDOCAINE HYDROCHLORIDE 10 MG/ML
INJECTION, SOLUTION EPIDURAL; INFILTRATION; INTRACAUDAL; PERINEURAL AS NEEDED
Status: DISCONTINUED | OUTPATIENT
Start: 2021-09-27 | End: 2021-09-27

## 2021-09-27 RX ADMIN — LIDOCAINE HYDROCHLORIDE 50 MG: 10 INJECTION, SOLUTION EPIDURAL; INFILTRATION; INTRACAUDAL; PERINEURAL at 10:47

## 2021-09-27 RX ADMIN — PROPOFOL 50 MG: 10 INJECTION, EMULSION INTRAVENOUS at 10:52

## 2021-09-27 RX ADMIN — PROPOFOL 50 MG: 10 INJECTION, EMULSION INTRAVENOUS at 10:47

## 2021-09-27 RX ADMIN — SODIUM CHLORIDE: 9 INJECTION, SOLUTION INTRAVENOUS at 10:43

## 2021-09-27 RX ADMIN — PROPOFOL 50 MG: 10 INJECTION, EMULSION INTRAVENOUS at 10:56

## 2021-09-27 RX ADMIN — PROPOFOL 50 MG: 10 INJECTION, EMULSION INTRAVENOUS at 11:00

## 2021-09-27 RX ADMIN — PROPOFOL 50 MG: 10 INJECTION, EMULSION INTRAVENOUS at 10:49

## 2021-09-27 NOTE — ANESTHESIA POSTPROCEDURE EVALUATION
Post-Op Assessment Note    CV Status:  Stable  Pain Score: 0    Pain management: adequate     Mental Status:  Arousable   Hydration Status:  Stable   PONV Controlled:  None   Airway Patency:  Patent      Post Op Vitals Reviewed: Yes      Staff: Anesthesiologist, CRNA         No complications documented      /68 (09/27/21 1107)    Temp 98 1 °F (36 7 °C) (09/27/21 1107)    Pulse 78 (09/27/21 1107)   Resp 16 (09/27/21 1107)    SpO2 100 % (09/27/21 1107)

## 2021-09-27 NOTE — ANESTHESIA PREPROCEDURE EVALUATION
Procedure:  COLONOSCOPY    Relevant Problems   ANESTHESIA (within normal limits)      CARDIO   (+) External hemorrhoid   (+) Hypertension      ENDO (within normal limits)      GI/HEPATIC (within normal limits)      /RENAL (within normal limits)      HEMATOLOGY   (+) Anemia      PULMONARY (within normal limits)      Other   (+) Diabetes mellitus (HCC)   (+) Polycystic ovarian syndrome      Lab Results   Component Value Date    HCT 37 8 05/03/2017     Lab Results   Component Value Date    SODIUM 140 05/03/2017    K 4 2 05/03/2017     05/03/2017    CO2 30 05/03/2017    BUN 8 05/03/2017    CREATININE 0 83 05/03/2017    GLUC 96 05/03/2017    CALCIUM 8 9 05/03/2017     No results found for: INR, PROTIME  Lab Results   Component Value Date    HGBA1C 7 8 (H) 08/26/2021            Physical Exam    Airway    Mallampati score: I  TM Distance: >3 FB  Neck ROM: full     Dental   No notable dental hx     Cardiovascular  Cardiovascular exam normal    Pulmonary  Pulmonary exam normal     Other Findings        Anesthesia Plan  ASA Score- 2     Anesthesia Type- IV sedation with anesthesia with ASA Monitors  Additional Monitors:   Airway Plan:           Plan Factors-Exercise tolerance (METS): >4 METS  Chart reviewed  Patient summary reviewed  Induction- intravenous  Postoperative Plan-     Informed Consent- Anesthetic plan and risks discussed with patient  I personally reviewed this patient with the CRNA  Discussed and agreed on the Anesthesia Plan with the CRNA  Yg Wynne

## 2021-11-16 ENCOUNTER — ANNUAL EXAM (OUTPATIENT)
Dept: OBGYN CLINIC | Facility: CLINIC | Age: 51
End: 2021-11-16
Payer: COMMERCIAL

## 2021-11-16 VITALS
BODY MASS INDEX: 49.08 KG/M2 | HEIGHT: 63 IN | WEIGHT: 277 LBS | DIASTOLIC BLOOD PRESSURE: 88 MMHG | SYSTOLIC BLOOD PRESSURE: 124 MMHG

## 2021-11-16 DIAGNOSIS — N95.2 VAGINAL ATROPHY: ICD-10-CM

## 2021-11-16 DIAGNOSIS — Z01.419 WOMEN'S ANNUAL ROUTINE GYNECOLOGICAL EXAMINATION: Primary | ICD-10-CM

## 2021-11-16 DIAGNOSIS — Z12.31 ENCOUNTER FOR SCREENING MAMMOGRAM FOR MALIGNANT NEOPLASM OF BREAST: ICD-10-CM

## 2021-11-16 DIAGNOSIS — Z30.41 ENCOUNTER FOR SURVEILLANCE OF CONTRACEPTIVE PILLS: ICD-10-CM

## 2021-11-16 LAB — SL AMB POCT URINE HCG: NORMAL

## 2021-11-16 PROCEDURE — 81025 URINE PREGNANCY TEST: CPT | Performed by: OBSTETRICS & GYNECOLOGY

## 2021-11-16 PROCEDURE — S0612 ANNUAL GYNECOLOGICAL EXAMINA: HCPCS | Performed by: OBSTETRICS & GYNECOLOGY

## 2021-11-16 RX ORDER — ACETAMINOPHEN AND CODEINE PHOSPHATE 120; 12 MG/5ML; MG/5ML
1 SOLUTION ORAL DAILY
Qty: 84 TABLET | Refills: 3 | Status: SHIPPED | OUTPATIENT
Start: 2021-11-16

## 2021-11-16 RX ORDER — ATORVASTATIN CALCIUM 10 MG/1
1 TABLET, FILM COATED ORAL EVERY EVENING
COMMUNITY
Start: 2021-06-08

## 2021-11-17 PROBLEM — N95.2 VAGINAL ATROPHY: Status: ACTIVE | Noted: 2021-11-17

## 2022-01-31 ENCOUNTER — OFFICE VISIT (OUTPATIENT)
Dept: OBGYN CLINIC | Facility: CLINIC | Age: 52
End: 2022-01-31
Payer: COMMERCIAL

## 2022-01-31 VITALS — BODY MASS INDEX: 47.83 KG/M2 | DIASTOLIC BLOOD PRESSURE: 90 MMHG | WEIGHT: 270 LBS | SYSTOLIC BLOOD PRESSURE: 140 MMHG

## 2022-01-31 DIAGNOSIS — B37.3 VAGINAL CANDIDIASIS: ICD-10-CM

## 2022-01-31 DIAGNOSIS — E11.9 TYPE 2 DIABETES MELLITUS WITHOUT COMPLICATION, WITHOUT LONG-TERM CURRENT USE OF INSULIN (HCC): ICD-10-CM

## 2022-01-31 DIAGNOSIS — R10.2 PELVIC PAIN: ICD-10-CM

## 2022-01-31 DIAGNOSIS — N95.2 VAGINAL ATROPHY: Primary | ICD-10-CM

## 2022-01-31 LAB
BV WHIFF TEST VAG QL: NEGATIVE
CLUE CELLS SPEC QL WET PREP: NEGATIVE
PH SMN: 4 [PH]
SL AMB POCT WET MOUNT: YES
T VAGINALIS VAG QL WET PREP: NEGATIVE
YEAST VAG QL WET PREP: POSITIVE

## 2022-01-31 PROCEDURE — 87210 SMEAR WET MOUNT SALINE/INK: CPT | Performed by: OBSTETRICS & GYNECOLOGY

## 2022-01-31 PROCEDURE — 99214 OFFICE O/P EST MOD 30 MIN: CPT | Performed by: OBSTETRICS & GYNECOLOGY

## 2022-01-31 RX ORDER — EMPAGLIFLOZIN 25 MG/1
25 TABLET, FILM COATED ORAL EVERY MORNING
COMMUNITY

## 2022-01-31 RX ORDER — FLUCONAZOLE 150 MG/1
150 TABLET ORAL ONCE
Qty: 2 TABLET | Refills: 0 | Status: SHIPPED | OUTPATIENT
Start: 2022-01-31 | End: 2022-01-31

## 2022-01-31 RX ORDER — CHLORTHALIDONE 25 MG/1
25 TABLET ORAL DAILY
COMMUNITY

## 2022-01-31 RX ORDER — SERTRALINE HYDROCHLORIDE 25 MG/1
25 TABLET, FILM COATED ORAL DAILY
COMMUNITY

## 2022-01-31 NOTE — PROGRESS NOTES
Assessment/Plan   Diagnoses and all orders for this visit:    Vaginal atrophy    Vaginal candidiasis  -     fluconazole (DIFLUCAN) 150 mg tablet; Take 1 tablet (150 mg total) by mouth once for 1 dose Repeat in 1 week  -     POCT wet mount  -     nystatin-triamcinolone (MYCOLOG-II) cream; Apply topically 2 (two) times a day as needed (Itching/irritation)    Pelvic pain    Type 2 diabetes mellitus without complication, without long-term current use of insulin ([facility])    Other orders  -     Empagliflozin (Jardiance) 25 MG TABS; Take 25 mg by mouth every morning  -     chlorthalidone 25 mg tablet; Take 25 mg by mouth daily  -     sertraline (ZOLOFT) 25 mg tablet; Take 25 mg by mouth daily  -     Cholecalciferol (VITAMIN D-3 PO); Take by mouth  -     Aspirin 81 MG CAPS; Take by mouth    1  Yeast vulvovaginitis-noted on exam and wet prep  Patient has noted that she started [place] about 3 months ago  She is also on metformin  She noted 1 month history of vulvar itching with rawness around the clitoral area  She did try Vagisil, Monistat 7, vinegar and baking soda externally this area without improvement  On exam there is mild erythema with slight excoriation noted in the periclitoral area  No raised or firm lesions are appreciated  These changes are noted diffusely in the bilateral labia minora/majora  Patient was counseled in detail about the findings  Wet prep is consistent with yeast infection  This is consistent with yeast vulvovaginitis  Fluconazole 150 mg p o  X1 with repeat in 1 week time sent a local pharmacy  Mycolog cream recommended to use b i d  P r n  Was sent a local pharmacy  Suggested she use this for at least 1-2 weeks time and then as needed  Also, practical recommendations such as mild soaps and laundry detergent were recommended along with trying to keep the area dry and quick clothing changes with any sweating    2  History of menometrorrhagia-resolved after D&C with hysteroscopy May   She notes bleeding monthly, was lighter this last cycle  3  Contraception-continue mini pill  4  Right pelvic pain-she has noted this for many months  Exam was without any focal findings  She declined rectal exam   Suggested ultrasound to assess for possible adnexal findings/uterine findings  She will return in the near future for ultrasound and office visit/possible exam with me  5  Diabetes/hypertension-follow-up with her treating doctor  She is on metformin, started Jardiance 3 months ago  6  History of external hemorrhoids-no current issues  Follow-up near future for ultrasound and office visit with me  Subjective   Patient ID: Jane Melton is a 46 y o  female  Vitals:    [phone]   BP: 140/90     Patient was seen today for follow-up visit  Please see assessment plan for details        The following portions of the patient's history were reviewed and updated as appropriate: allergies, current medications, past family history, past medical history, past social history, past surgical history and problem list   Past Medical History:   Diagnosis Date    Abnormal Pap smear of cervix     Anemia     Arthritis     Decreased libido     Diabetes mellitus ([address] )     Female infertility     HPV (human papilloma virus) infection     Hypertension     Ovarian cyst     Polycystic ovarian syndrome     Urinary tract infection     Uterine leiomyoma     resolved 2017     Past Surgical History:   Procedure Laterality Date     SECTION      DILATION AND CURETTAGE OF UTERUS      ENDOMETRIAL BIOPSY      Onset  by suction    LAPAROSCOPIC GASTRIC BANDING      OH HYSTEROSCOPY,W/ENDO BX N/A 5/3/2017    Procedure: HYSTEROSCOPY; D&C; ENDOMETRIAL POLYPECTOMY ;  Surgeon: Gardenia Camejo MD;  Location: [facility];  Service: Gynecology     OB History    Para Term  AB Living   1 1 1     1   SAB IAB Ectopic Multiple Live Births           1      # Outcome Date GA Lbr Jorje/2nd Weight Sex Delivery Anes PTL Lv   1 Term                Current Outpatient Medications:     Aspirin 81 MG CAPS, Take by mouth, Disp: , Rfl:     atorvastatin (LIPITOR) 10 mg tablet, Take 1 tablet by mouth every evening, Disp: , Rfl:     cetirizine (ZYRTEC ALLERGY) 10 mg tablet, Take by mouth, Disp: , Rfl:     chlorthalidone 25 mg tablet, Take 25 mg by mouth daily, Disp: , Rfl:     Cholecalciferol (VITAMIN D-3 PO), Take by mouth, Disp: , Rfl:     Empagliflozin (Jardiance) 25 MG TABS, Take 25 mg by mouth every morning, Disp: , Rfl:     LORazepam (ATIVAN) 0 5 mg tablet, Take 0 5 mg by mouth every 6 (six) hours as needed for anxiety, Disp: , Rfl:     losartan (COZAAR) 50 mg tablet, Take 100 mg by mouth daily  , Disp: , Rfl:     meloxicam (MOBIC) 15 mg tablet, Take 15 mg by mouth daily, Disp: , Rfl:     metFORMIN (GLUCOPHAGE) 500 mg tablet, Take 500 mg by mouth 2 (two) times a day with meals, Disp: , Rfl:     norethindrone (MICRONOR) 0 35 MG tablet, Take 1 tablet (0 35 mg total) by mouth daily, Disp: 84 tablet, Rfl: 3    sertraline (ZOLOFT) 25 mg tablet, Take 25 mg by mouth daily, Disp: , Rfl:     fluconazole (DIFLUCAN) 150 mg tablet, Take 1 tablet (150 mg total) by mouth once for 1 dose Repeat in 1 week, Disp: 2 tablet, Rfl: 0    lisinopril (ZESTRIL) 20 mg tablet, Take by mouth (Patient not taking: Reported on 11/16/2021 ), Disp: , Rfl:     nystatin-triamcinolone (MYCOLOG-II) cream, Apply topically 2 (two) times a day as needed (Itching/irritation), Disp: 30 g, Rfl: 1  No Known Allergies  Social History     Socioeconomic History    Marital status: /Civil Union     Spouse name: Not on file    Number of children: 1    Years of education: Not on file    Highest education level: Not on file   Occupational History    Not on file   Tobacco Use    Smoking status: Never Smoker    Smokeless tobacco: Never Used   Vaping Use    Vaping Use: Never used   Substance and Sexual Activity    Alcohol use: No    Drug use: No    Sexual activity: Yes     Partners: Male     Comment: oral contraceptive prescribe last assessed 05/16/2017   Other Topics Concern    Not on file   Social History Narrative    Always uses seat belt    Caffeine use    Confucianist affiliation     Social Determinants of Health     Financial Resource Strain: Not on file   Food Insecurity: Not on file   Transportation Needs: Not on file   Physical Activity: Not on file   Stress: Not on file   Social Connections: Not on file   Intimate Partner Violence: Not on file   Housing Stability: Not on file     Family History   Problem Relation Age of Onset    Diabetes Mother     Hypertension Mother     Diabetes Father     Heart disease Father     Heart attack Paternal Grandmother     Heart attack Paternal Grandfather     Heart disease Other     Heart disease Other     No Known Problems Maternal Grandmother     No Known Problems Sister     No Known Problems Maternal Aunt     No Known Problems Maternal Aunt     No Known Problems Maternal Aunt     No Known Problems Maternal Aunt     No Known Problems Maternal Aunt     No Known Problems Maternal Aunt     Ovarian cancer Paternal Aunt 36    No Known Problems Paternal Aunt     No Known Problems Paternal Aunt     No Known Problems Paternal Aunt     No Known Problems Paternal Aunt     No Known Problems Paternal Aunt     No Known Problems Sister     No Known Problems Maternal Grandfather        Review of Systems   Constitutional: Negative for chills, diaphoresis, fatigue and fever  Respiratory: Negative for apnea, cough, chest tightness, shortness of breath and wheezing  Cardiovascular: Negative for chest pain, palpitations and leg swelling  Gastrointestinal: Negative for abdominal distention, abdominal pain, anal bleeding, constipation, diarrhea, nausea, rectal pain and vomiting  Genitourinary: Positive for vaginal discharge   Negative for difficulty urinating, dyspareunia, dysuria, frequency, hematuria, menstrual problem, pelvic pain, urgency, vaginal bleeding and vaginal pain  Musculoskeletal: Negative for arthralgias, back pain and myalgias  Skin: Negative for color change and rash  Neurological: Negative for dizziness, syncope, light-headedness, numbness and headaches  Hematological: Negative for adenopathy  Does not bruise/bleed easily  Psychiatric/Behavioral: Negative for dysphoric mood and sleep disturbance  The patient is not nervous/anxious  Objective   Physical Exam  OBGyn Exam     Objective      /90 (BP Location: Left arm, Patient Position: Sitting, Cuff Size: Adult)   Wt 122 kg (270 lb)   LMP 01/10/2022   BMI 47 83 kg/m²     General:   alert and oriented, in no acute distress   Neck:    Breast:    Heart:    Lungs:    Abdomen: Abdomen soft, nontender without masses noted  Vulva: Mild erythema noted, without lesions or raised or firm areas appreciated  Slight excoriation noted in the periclitoral area  Vagina: Mildly atrophic, small amount of white/red discharge, without erythema or lesions  Cervix: Mildly atrophic, small amount of white/red discharge, without erythema or cervicitis noted    No Cervical motion tenderness   Uterus: top normal size, mid-position, non-tender   Adnexa: no mass, fullness, tenderness   Rectum: Deferred, patient declined    Psych:  Normal mood and affect   Skin:  Without obvious lesions   Eyes: symmetric, with normal movements and reactivity   Musculoskeletal:  Normal muscle tone and movements appreciated

## 2022-01-31 NOTE — PATIENT INSTRUCTIONS
Yeast Infection   WHAT YOU NEED TO KNOW:   What is a yeast infection? A yeast infection, or vaginal candidiasis, is a common vaginal infection  A yeast infection is caused by a fungus, or yeast-like germ  Fungi are normally found in your vagina  Too many fungi can cause an infection  What increases my risk for a yeast infection? · Pregnancy    · Medicines, such as antibiotics, birth control pills, or steroid medicine    · Medical conditions, such as diabetes    · Contraceptive devices, such as diaphragms, sponges, and intrauterine devices    What are the signs and symptoms of a yeast infection? · Thick, white, cheese-like discharge from your vagina    · Itching, swelling, and redness in your vagina    · Pain or burning when you urinate    · Pain during sexual intercourse    How is a yeast infection diagnosed and treated? · Your healthcare provider will ask about your medical history and examine you  A sample of your vaginal discharge may show what germ is causing your infection  · Medicines help treat the fungal infection and decrease inflammation  The medicine may be a pill, cream, ointment, or vaginal tablet or suppository  With treatment, the infection is usually gone within a week  What can I do to keep my vagina healthy? · Clean your genital area with mild soap and warm water each day  Do not get soap inside your vagina  Gently dry the area after washing  Do not use hot tubs  The heat and moisture from hot tubs can increase your risk for another yeast infection  · Always wipe from front to back  after you use the toilet  This prevents spreading bacteria from your rectal area into your vagina  · Do not wear tight-fitting clothes or undergarments  for long periods of time  Wear cotton underwear during the day  Cotton helps keep your genital area dry and does not hold in warmth or moisture  Do not wear underwear at night  · Do not douche  or use feminine hygiene sprays or bubble bath   Do not use pads or tampons that are scented, or colored or perfumed toilet paper  · Do not have sex until your symptoms go away  Have your partner wear a condom until you complete your course of medication  · Ask your healthcare provider about birth control options if necessary  Condoms have latex and diaphragms have gel that kills sperm  Both of these may irritate your genital area  When should I call my doctor or gynecologist?   · You have a fever and chills  · You develop abdominal or pelvic pain  · Your discharge is bloody and it is not your monthly period  · Your signs and symptoms get worse, even after treatment  · You have questions or concerns about your condition or care  CARE AGREEMENT:   You have the right to help plan your care  Learn about your health condition and how it may be treated  Discuss treatment options with your healthcare providers to decide what care you want to receive  You always have the right to refuse treatment  The above information is an  only  It is not intended as medical advice for individual conditions or treatments  Talk to your doctor, nurse or pharmacist before following any medical regimen to see if it is safe and effective for you  © Copyright [employer] 2021 Information is for End User's use only and may not be sold, redistributed or otherwise used for commercial purposes   All illustrations and images included in CareNotes® are the copyrighted property of [employer]  or [address] [place] [address] [employer]

## 2022-03-04 ENCOUNTER — OFFICE VISIT (OUTPATIENT)
Dept: OBGYN CLINIC | Facility: CLINIC | Age: 52
End: 2022-03-04
Payer: COMMERCIAL

## 2022-03-04 ENCOUNTER — ULTRASOUND (OUTPATIENT)
Dept: OBGYN CLINIC | Facility: CLINIC | Age: 52
End: 2022-03-04
Payer: COMMERCIAL

## 2022-03-04 VITALS — SYSTOLIC BLOOD PRESSURE: 122 MMHG | WEIGHT: 264 LBS | BODY MASS INDEX: 46.77 KG/M2 | DIASTOLIC BLOOD PRESSURE: 80 MMHG

## 2022-03-04 DIAGNOSIS — N90.89 VULVOVAGINAL DRYNESS: ICD-10-CM

## 2022-03-04 DIAGNOSIS — N89.8 VULVOVAGINAL DRYNESS: ICD-10-CM

## 2022-03-04 DIAGNOSIS — R10.2 PELVIC PAIN: ICD-10-CM

## 2022-03-04 DIAGNOSIS — R10.2 PELVIC PAIN: Primary | ICD-10-CM

## 2022-03-04 PROCEDURE — 99213 OFFICE O/P EST LOW 20 MIN: CPT | Performed by: OBSTETRICS & GYNECOLOGY

## 2022-03-04 PROCEDURE — 76856 US EXAM PELVIC COMPLETE: CPT | Performed by: OBSTETRICS & GYNECOLOGY

## 2022-03-04 PROCEDURE — 76830 TRANSVAGINAL US NON-OB: CPT | Performed by: OBSTETRICS & GYNECOLOGY

## 2022-03-04 NOTE — PROGRESS NOTES
AMB US Pelvic Non OB    Date/Time: 3/4/2022 6:48 AM  Performed by: Violet Hawkins  Authorized by: Jessica Alcazar MD   Universal Protocol:  Patient identity confirmed: verbally with patient      Procedure details:     Technique:  US Pelvic, Non-OB with complete exam    Position: supine exam    Uterine findings:     Length (cm): 10 83    Height (cm):  5 41    Width (cm):  5 48    Endometrial stripe: identified      Endometrium thickness (mm):  8 6  Left ovary findings:     Left ovary:  Visualized    Length (cm): 3 06    Height (cm): 1 64    Width (cm): 1 74  Right ovary findings:     Right ovary:  Visualized    Length (cm): 3 06    Height (cm): 1 31    Width (cm): 1 74  Other findings:     Free pelvic fluid: not identified      Free peritoneal fluid: not identified    Post-Procedure Details:     Impression:  Anteflexed uterus is inhomogeneous throughout without distinct fibroids  The bilateral ovaries appear within normal limits where seen  No free fluid  Patient body habitus and bowel gas/fecal material limit the exam      Tolerance: Tolerated well, no immediate complications    Complications: no complications    Additional Procedure Comments:      Transvaginal and transabdominal techniques were utilized  BeMe Intimatesiq F8 E8C-RS transvaginal transducer Serial # [id number] was used to perform the examination today and subsequently followed with high level disinfection utilizing Trophon EPR procedure  Ultrasound performed at:     [address] [address]  Phone:  [phone]  Fax:  [phone]

## 2022-03-04 NOTE — PATIENT INSTRUCTIONS
Pelvic Pain in Women   WHAT YOU NEED TO KNOW:   What do I need to know about pelvic pain? You may have pain on one or both sides of your pelvis  Pelvic pain may occur with certain body positions or activities, such as when you have sex or a bowel movement  It may worsen during your monthly period or after you sit or stand for a long time  Chronic pelvic pain is pain that continues for longer than 6 months  What causes pelvic pain in women? · Gynecologic conditions , such as pelvic inflammatory disease (PID), endometriosis, or uterine fibroids    · Bowel and bladder conditions , such as irritable bowel syndrome, bladder inflammation, or tumors     · Muscle and nerve conditions , such as swelling or weakness of your pelvic muscles, or damage to the nerves of your pelvic area (neuropathy)    · Psychological issues as a result of physical or sexual abuse, or drug abuse    How is pelvic pain treated? · Pain medicine  may be given in pills, injections, or creams to relieve your pain  · Hormones  may be given if your pain gets worse with your menstrual cycle  · Antibiotics  may be given if your pain is caused by infection  · Surgery  may be done if other treatments do not relieve your pain  How can I manage my pelvic pain? · Keep a pain diary  Write down when your pain happens, how severe it is, and any other symptoms you have with your pain  A diary will help you keep track of pain cycles  It may also help your healthcare provider find out what is causing your pain  · Learn ways to relax  Deep breathing, meditation, and relaxation techniques can help decrease your pain  When you are tense, your pain may increase  · Change the foods you eat if you have irritable bowel syndrome  Ask your healthcare provider about the best foods for you  Call 911 for any of the following:   · You have severe chest pain and sudden trouble breathing  When should I seek immediate care?    · You have heavy or unusual vaginal bleeding, and you feel lightheaded or faint  When should I contact my healthcare provider? · You have pelvic pain that does not go away after you take pain medicine  · You develop new symptoms or your symptoms are worse than before  · You have questions or concerns about your condition or care  CARE AGREEMENT:   You have the right to help plan your care  Learn about your health condition and how it may be treated  Discuss treatment options with your healthcare providers to decide what care you want to receive  You always have the right to refuse treatment  The above information is an  only  It is not intended as medical advice for individual conditions or treatments  Talk to your doctor, nurse or pharmacist before following any medical regimen to see if it is safe and effective for you  © Copyright [address] 2022 Information is for End User's use only and may not be sold, redistributed or otherwise used for commercial purposes  All illustrations and images included in CareNotes® are the copyrighted property of A D A M [employer] Inc  or [facility] [facility]  Vaginal Atrophy   WHAT YOU NEED TO KNOW:   What is vaginal atrophy? Vaginal atrophy is a condition that causes thinning, drying, and inflammation of vaginal tissue  This condition is caused by decreased levels of estrogen (a female sex hormone)  Vaginal atrophy can increase your risk for vaginal and urinary tract infections  Vaginal atrophy can worsen over time if not treated  What causes or increases your risk of vaginal atrophy? · Menopause     · Medicines that lower your estrogen levels, such as those used to treat breast cancer, endometriosis, or fibroids    · Radiation to your pelvic area     · Surgery to remove the ovaries    · Breastfeeding    What are the signs and symptoms of vaginal atrophy?    · Vaginal dryness, itching, and burning    · Vaginal discharge    · Pain or discomfort during sex    · Light bleeding after sex    · Burning during urination    · Frequent, sudden, strong urges to urinate    · Urinary incontinence (loss of control of your bladder)    How is vaginal atrophy diagnosed? Your healthcare provider will ask about your symptoms  A pelvic exam will be done to examine your vagina and cervix  Your healthcare provider will place a speculum into your vagina to open and examine it  A sample of discharge from your vagina may be collected and tested  A urine test may also be done  How is vaginal atrophy treated? · Over-the counter vaginal moisturizers  can help reduce dryness  Your healthcare provider may recommend that you use a vaginal moisturizer several times each week and during sex  Only use creams that are made for vaginal use  Do  not  use petroleum jelly  Lubricants can be used during sex to decrease pain and discomfort  · Estrogen  may help decrease dryness  It may also lower your risk of vaginal infections if you are going through menopause  It can also help to relieve urinary symptoms  Estrogen may be prescribed in the form of a cream, tablet, or ring  These medicines can be applied or inserted into the vagina  Estrogen can also be prescribed in the form of a pill  When should I contact my healthcare provider? · You have a foul-smelling odor coming from your vagina  · You have a thick, cheese-like discharge from your vagina  · You have itching, swelling, or redness in your vagina  · You have pain or burning when you urinate  · Your urine smells bad  · Your symptoms do not improve, or they get worse  · You have questions or concerns about your condition or care  CARE AGREEMENT:   You have the right to help plan your care  Learn about your health condition and how it may be treated  Discuss treatment options with your healthcare providers to decide what care you want to receive  You always have the right to refuse treatment   The above information is an  only  It is not intended as medical advice for individual conditions or treatments  Talk to your doctor, nurse or pharmacist before following any medical regimen to see if it is safe and effective for you  © Copyright [employer] 2022 Information is for End User's use only and may not be sold, redistributed or otherwise used for commercial purposes   All illustrations and images included in CareNotes® are the copyrighted property of [employer]  or [address] [place] [address]

## 2022-03-04 NOTE — PROGRESS NOTES
Assessment/Plan   Diagnoses and all orders for this visit:    Pelvic pain    Vulvovaginal dryness    1  Right abdominal/pelvic discomfort-she notes this intermittently, not severe  Ultrasound today was unremarkable with no findings of the uterus or adnexa  Prior colonoscopy was okay  Discussed other possible etiologies including bladder which she has no symptoms and muscles/ligaments/nerves  Likely, this does represent some type of muscular issue  Practical recommendations were reviewed  She will call or return with any issues  2   Yeast vulvovaginitis-denies any current symptoms  She was prescribed fluconazole and Mycolog at last visit  She is improved, to call with any symptoms  3  history of menometrorrhagia-resolved after hysteroscopy with D&C May 2017  She now notes bleeding around 6 week intervals  She will call with any menometrorrhagia  4  Contraception-continue mini pill  Continue until 12 months amenorrhea  5  History of diabetes/hypertension-continue treating doctor  She is on Jardiance and metformin  6  Vulvar dryness-she does have prescription for Mycolog cream, suggested she use this  Could also consider hydrocortisone  She states this is externally, not internally  She denies any vaginitis symptoms  To call or return with any issues  7  History of external hemorrhoids  8  Elevated BMI  Follow-up November 2022 for yearly exam as needed  Subjective   Patient ID: Kelley Little is a 46 y o  female  Vitals:    03/04/22 0752   BP: 122/80     Patient was seen today for follow-up ultrasound and office visit with me  Please see assessment plan for details        The following portions of the patient's history were reviewed and updated as appropriate: allergies, current medications, past family history, past medical history, past social history, past surgical history and problem list   Past Medical History:   Diagnosis Date    Abnormal Pap smear of cervix     Anemia     Arthritis     Decreased libido     Diabetes mellitus ([facility] [address] )     Female infertility     HPV (human papilloma virus) infection     Hypertension     Ovarian cyst     Polycystic ovarian syndrome     Urinary tract infection     Uterine leiomyoma     resolved 2017     Past Surgical History:   Procedure Laterality Date     SECTION      DILATION AND CURETTAGE OF UTERUS      ENDOMETRIAL BIOPSY      Onset  by suction    LAPAROSCOPIC GASTRIC BANDING      MN HYSTEROSCOPY,W/ENDO BX N/A 5/3/2017    Procedure: HYSTEROSCOPY; D&C; ENDOMETRIAL POLYPECTOMY ;  Surgeon: Sofía Lezama MD;  Location: [facility];  Service: Gynecology     OB History    Para Term  AB Living   1 1 1     1   SAB IAB Ectopic Multiple Live Births           1      # Outcome Date GA Lbr Jorje/2nd Weight Sex Delivery Anes PTL Lv   1 Term                Current Outpatient Medications:     Aspirin 81 MG CAPS, Take by mouth, Disp: , Rfl:     atorvastatin (LIPITOR) 10 mg tablet, Take 1 tablet by mouth every evening, Disp: , Rfl:     cetirizine (ZYRTEC ALLERGY) 10 mg tablet, Take by mouth, Disp: , Rfl:     chlorthalidone 25 mg tablet, Take 25 mg by mouth daily, Disp: , Rfl:     Cholecalciferol (VITAMIN D-3 PO), Take by mouth, Disp: , Rfl:     Empagliflozin (Jardiance) 25 MG TABS, Take 25 mg by mouth every morning, Disp: , Rfl:     lisinopril (ZESTRIL) 20 mg tablet, Take by mouth (Patient not taking: Reported on 2021 ), Disp: , Rfl:     LORazepam (ATIVAN) 0 5 mg tablet, Take 0 5 mg by mouth every 6 (six) hours as needed for anxiety, Disp: , Rfl:     losartan (COZAAR) 50 mg tablet, Take 100 mg by mouth daily  , Disp: , Rfl:     meloxicam (MOBIC) 15 mg tablet, Take 15 mg by mouth daily, Disp: , Rfl:     metFORMIN (GLUCOPHAGE) 500 mg tablet, Take 500 mg by mouth 2 (two) times a day with meals, Disp: , Rfl:     norethindrone (MICRONOR) 0 35 MG tablet, Take 1 tablet (0 35 mg total) by mouth daily, Disp: 84 tablet, Rfl: 3    nystatin-triamcinolone (MYCOLOG-II) cream, Apply topically 2 (two) times a day as needed (Itching/irritation), Disp: 30 g, Rfl: 1    sertraline (ZOLOFT) 25 mg tablet, Take 25 mg by mouth daily, Disp: , Rfl:   No Known Allergies  Social History     Socioeconomic History    Marital status: /Civil Union     Spouse name: Not on file    Number of children: 1    Years of education: Not on file    Highest education level: Not on file   Occupational History    Not on file   Tobacco Use    Smoking status: Never Smoker    Smokeless tobacco: Never Used   Vaping Use    Vaping Use: Never used   Substance and Sexual Activity    Alcohol use: No    Drug use: No    Sexual activity: Yes     Partners: Male     Comment: oral contraceptive prescribe last assessed 05/16/2017   Other Topics Concern    Not on file   Social History Narrative    Always uses seat belt    Caffeine use    Roman Catholic affiliation     Social Determinants of Health     Financial Resource Strain: Not on file   Food Insecurity: Not on file   Transportation Needs: Not on file   Physical Activity: Not on file   Stress: Not on file   Social Connections: Not on file   Intimate Partner Violence: Not on file   Housing Stability: Not on file     Family History   Problem Relation Age of Onset    Diabetes Mother     Hypertension Mother     Diabetes Father     Heart disease Father     Heart attack Paternal Grandmother     Heart attack Paternal Grandfather     Heart disease Other     Heart disease Other     No Known Problems Maternal Grandmother     No Known Problems Sister     No Known Problems Maternal Aunt     No Known Problems Maternal Aunt     No Known Problems Maternal Aunt     No Known Problems Maternal Aunt     No Known Problems Maternal Aunt     No Known Problems Maternal Aunt     Ovarian cancer Paternal Aunt 36    No Known Problems Paternal Aunt     No Known Problems Paternal Aunt     No Known Problems Paternal Aunt     No Known Problems Paternal Aunt     No Known Problems Paternal Aunt     No Known Problems Sister     No Known Problems Maternal Grandfather        Review of Systems   Constitutional: Negative for chills, diaphoresis, fatigue and fever  Respiratory: Negative for apnea, cough, chest tightness, shortness of breath and wheezing  Cardiovascular: Negative for chest pain, palpitations and leg swelling  Gastrointestinal: Negative for abdominal distention, abdominal pain, anal bleeding, constipation, diarrhea, nausea, rectal pain and vomiting  Genitourinary: Negative for difficulty urinating, dyspareunia, dysuria, frequency, hematuria, menstrual problem, pelvic pain, urgency, vaginal bleeding, vaginal discharge and vaginal pain  Musculoskeletal: Negative for arthralgias, back pain and myalgias  Skin: Negative for color change and rash  Neurological: Negative for dizziness, syncope, light-headedness, numbness and headaches  Hematological: Negative for adenopathy  Does not bruise/bleed easily  Psychiatric/Behavioral: Negative for dysphoric mood and sleep disturbance  The patient is not nervous/anxious          Objective   Physical Exam  OBGyn Exam     Objective      /80 (BP Location: Left arm, Patient Position: Sitting, Cuff Size: Adult)   Wt 120 kg (264 lb)   BMI 46 77 kg/m²     General:   alert and oriented, in no acute distress   Neck:    Breast:    Heart:    Lungs:    Abdomen: Nontender   Vulva:    Vagina:    Cervix:    Uterus:    Adnexa:    Rectum:     Psych:  Normal mood and affect   Skin:  Without obvious lesions   Eyes: symmetric, with normal movements and reactivity   Musculoskeletal:  Normal muscle tone and movements appreciated

## 2022-05-24 ENCOUNTER — OFFICE VISIT (OUTPATIENT)
Dept: OBGYN CLINIC | Facility: CLINIC | Age: 52
End: 2022-05-24
Payer: COMMERCIAL

## 2022-05-24 VITALS
SYSTOLIC BLOOD PRESSURE: 130 MMHG | DIASTOLIC BLOOD PRESSURE: 88 MMHG | BODY MASS INDEX: 44.83 KG/M2 | HEIGHT: 63 IN | WEIGHT: 253 LBS

## 2022-05-24 DIAGNOSIS — B37.3 CANDIDA VAGINITIS: Primary | ICD-10-CM

## 2022-05-24 PROCEDURE — 87480 CANDIDA DNA DIR PROBE: CPT | Performed by: OBSTETRICS & GYNECOLOGY

## 2022-05-24 PROCEDURE — 87660 TRICHOMONAS VAGIN DIR PROBE: CPT | Performed by: OBSTETRICS & GYNECOLOGY

## 2022-05-24 PROCEDURE — 99214 OFFICE O/P EST MOD 30 MIN: CPT | Performed by: OBSTETRICS & GYNECOLOGY

## 2022-05-24 PROCEDURE — 87510 GARDNER VAG DNA DIR PROBE: CPT | Performed by: OBSTETRICS & GYNECOLOGY

## 2022-05-24 RX ORDER — FLUCONAZOLE 150 MG/1
150 TABLET ORAL WEEKLY
Qty: 2 TABLET | Refills: 0 | Status: SHIPPED | OUTPATIENT
Start: 2022-05-24 | End: 2022-07-06

## 2022-05-24 NOTE — PROGRESS NOTES
Assessment/Plan:      Diagnoses and all orders for this visit:    Candida vaginitis  Comments:  Diflucan 150 mg will be prescribed to be taken 1 now followed by the 2nd next week    Other orders  -     sitaGLIPtin (JANUVIA) 100 mg tablet; Take 100 mg by mouth in the morning  Subjective:     Patient ID: Clint Alan is a 46 y o  female  HPI:  Jacklyn Speaker is here today because of a probable yeast infection  She has been on Jardiance which is known to make her prone to this  She was recently switched off of this to a different medication  The patient however feels that she still may have a yeast infection and would like to have this confirmed  Review of Systems  burning, itching, and discharge of the vulvar and vaginal regions    Objective:     Physical Exam  the vulvar exam does show some erythema peripherally and vaginal exam does show a thick white discharge for which both a wet mount and affirm test are obtained  The wet mount is positive for yeast forms

## 2022-05-26 LAB
CANDIDA RRNA VAG QL PROBE: POSITIVE
G VAGINALIS RRNA GENITAL QL PROBE: NEGATIVE
T VAGINALIS RRNA GENITAL QL PROBE: NEGATIVE

## 2022-12-28 ENCOUNTER — HOSPITAL ENCOUNTER (OUTPATIENT)
Dept: MAMMOGRAPHY | Facility: MEDICAL CENTER | Age: 52
Discharge: HOME | End: 2022-12-28

## 2022-12-28 VITALS — BODY MASS INDEX: 47.84 KG/M2 | WEIGHT: 270 LBS | HEIGHT: 63 IN

## 2022-12-28 DIAGNOSIS — Z12.31 ENCOUNTER FOR SCREENING MAMMOGRAM FOR MALIGNANT NEOPLASM OF BREAST: ICD-10-CM

## 2023-01-26 ENCOUNTER — ANNUAL EXAM (OUTPATIENT)
Dept: GYNECOLOGY | Facility: CLINIC | Age: 53
End: 2023-01-26

## 2023-01-26 VITALS
HEIGHT: 62 IN | WEIGHT: 273.6 LBS | SYSTOLIC BLOOD PRESSURE: 116 MMHG | BODY MASS INDEX: 50.35 KG/M2 | DIASTOLIC BLOOD PRESSURE: 82 MMHG

## 2023-01-26 DIAGNOSIS — Z01.419 WOMEN'S ANNUAL ROUTINE GYNECOLOGICAL EXAMINATION: Primary | ICD-10-CM

## 2023-01-26 DIAGNOSIS — Z12.31 ENCOUNTER FOR SCREENING MAMMOGRAM FOR BREAST CANCER: ICD-10-CM

## 2023-01-26 DIAGNOSIS — N92.6 IRREGULAR MENSES: ICD-10-CM

## 2023-01-26 DIAGNOSIS — N95.2 VAGINAL ATROPHY: ICD-10-CM

## 2023-01-26 RX ORDER — METFORMIN HYDROCHLORIDE 500 MG/1
500 TABLET, EXTENDED RELEASE ORAL 2 TIMES DAILY WITH MEALS
COMMUNITY
Start: 2022-12-14

## 2023-01-26 RX ORDER — LOSARTAN POTASSIUM 100 MG/1
100 TABLET ORAL DAILY
COMMUNITY
Start: 2022-12-14

## 2023-01-26 NOTE — PROGRESS NOTES
Assessment/Plan   Diagnoses and all orders for this visit:    Women's annual routine gynecological examination    Encounter for screening mammogram for breast cancer  -     Mammo screening bilateral w 3d & cad; Future    Irregular menses  -     Follicle stimulating hormone; Future  -     Estradiol; Future  -     hCG, quantitative; Future  -     CBC; Future    Other orders  -     losartan (COZAAR) 100 MG tablet; Take 100 mg by mouth daily (Patient not taking: Reported on 1/26/2023)  -     metFORMIN (GLUCOPHAGE-XR) 500 mg 24 hr tablet; Take 500 mg by mouth 2 (two) times a day with meals (Patient not taking: Reported on 1/26/2023)  -     sertraline (ZOLOFT) 50 mg tablet; Take 50 mg by mouth daily    1  yearly exam- Pap smear done with reflex HPV, self breast awareness reviewed, calcium/vitamin D recommendations discussed, mammogram request given, colonoscopy up-to-date follow-up as per specialist   2  irregular menses- was on minipill, stopped October 2022  Had 2 days of brown spotting on 12/23 and 12/24/2022  This had been longer than 1 year since her previous bleeding, but this was on minipill, which she frequently has amenorrhea with  Patient is status post D&C 2015 in 2017, with polyp found in 2017  Counseled about postmenopausal bleeding  Did discuss sonohysterogram with endometrial biopsy  We will defer at this time for minipill use and not classic definition of postmenopausal bleeding at this time  However, would strongly suggest she call with any heavy bleeding or irregular bleeding or prolonged bleeding  Would have a low threshold for evaluation given her elevated BMI and diabetes status  To check [address] and estradiol along with CBC and hCG  Recent TSH was normal   3  history of menometrorrhagia-denies at this time  Evaluation as noted above  4  contraception- was on minipill, did stop this October 2022 after greater than 12 months amenorrhea  She is concerned about lack of contraception    Likely, given her age that she is menopausal or perimenopausal   To check FSH and estradiol  If [address] is elevated, this would be consistent with menopause/near menopause and pregnancy would be highly unlikely  If [address] is near normal, might consider going back on minipill  5  history of diabetes/hypertension- follow-up with treating doctor  Most recent hemoglobin A1c was 7 7  6  history of yeast/vaginal dryness-denies any current concerns  Exam today was normal   7  history of external hemorrhoids- patient declined rectal exam today  She denies any symptoms  Had colonoscopy with no significant issues previously  8  elevated BMI  9  other-son going to college next year  My congratulations given  10  vaginal atrophy- no significant changes noted on exam today  Vaginal lubrication/moisturizer sheet given, to use accordingly  Follow-up 1 year for yearly exam or as needed  Subjective   Patient ID: Erika Law is a 46 y o  female      Vitals:    [phone]   BP: 116/82     HPI    The following portions of the patient's history were reviewed and updated as appropriate: allergies, current medications, past family history, past medical history, past social history, past surgical history and problem list   Past Medical History:   Diagnosis Date   • Abnormal Pap smear of cervix    • Anemia    • Arthritis    • Decreased libido    • Diabetes mellitus ([address] )    • Female infertility    • HPV (human papilloma virus) infection    • Hypertension    • Ovarian cyst    • Polycystic ovarian syndrome    • Urinary tract infection    • Uterine leiomyoma     resolved 2017     Past Surgical History:   Procedure Laterality Date   •  SECTION     • DILATION AND CURETTAGE OF UTERUS     • ENDOMETRIAL BIOPSY      Onset  by suction   • LAPAROSCOPIC GASTRIC BANDING     • [place] HYSTEROSCOPY BX ENDOMETRIUM&/POLYPC W/WO D&C N/A 5/3/2017    Procedure: HYSTEROSCOPY; D&C; ENDOMETRIAL POLYPECTOMY ;  Surgeon: Katie Mock MD;  Location: [facility];  Service: Gynecology     OB History    Para Term  AB Living   1 1 1     1   SAB IAB Ectopic Multiple Live Births           1      # Outcome Date GA Lbr Jorje/2nd Weight Sex Delivery Anes PTL Lv   1 Term                Current Outpatient Medications:   •  Aspirin 81 MG CAPS, Take by mouth, Disp: , Rfl:   •  atorvastatin (LIPITOR) 10 mg tablet, Take 1 tablet by mouth every evening, Disp: , Rfl:   •  cetirizine (ZyrTEC) 10 mg tablet, Take by mouth, Disp: , Rfl:   •  chlorthalidone 25 mg tablet, Take 25 mg by mouth daily, Disp: , Rfl:   •  Cholecalciferol (VITAMIN D-3 PO), Take by mouth, Disp: , Rfl:   •  LORazepam (ATIVAN) 0 5 mg tablet, Take 0 5 mg by mouth every 6 (six) hours as needed for anxiety, Disp: , Rfl:   •  losartan (COZAAR) 50 mg tablet, Take 100 mg by mouth daily  , Disp: , Rfl:   •  meloxicam (MOBIC) 15 mg tablet, Take 15 mg by mouth daily, Disp: , Rfl:   •  metFORMIN (GLUCOPHAGE) 500 mg tablet, Take 500 mg by mouth 2 (two) times a day with meals, Disp: , Rfl:   •  sertraline (ZOLOFT) 50 mg tablet, Take 50 mg by mouth daily, Disp: , Rfl:   •  sitaGLIPtin (JANUVIA) 100 mg tablet, Take 100 mg by mouth in the morning , Disp: , Rfl:   •  Empagliflozin (Jardiance) 25 MG TABS, Take 25 mg by mouth every morning (Patient not taking: Reported on 2022), Disp: , Rfl:   •  lisinopril (ZESTRIL) 20 mg tablet, Take by mouth (Patient not taking: Reported on 2021), Disp: , Rfl:   •  losartan (COZAAR) 100 MG tablet, Take 100 mg by mouth daily (Patient not taking: Reported on 2023), Disp: , Rfl:   •  metFORMIN (GLUCOPHAGE-XR) 500 mg 24 hr tablet, Take 500 mg by mouth 2 (two) times a day with meals (Patient not taking: Reported on 2023), Disp: , Rfl:   •  norethindrone (MICRONOR) 0 35 MG tablet, Take 1 tablet (0 35 mg total) by mouth daily (Patient not taking: Reported on 2023), Disp: 84 tablet, Rfl: 3  •  nystatin-triamcinolone (MYCOLOG-II) cream, Apply topically 2 (two) times a day as needed (Itching/irritation) (Patient not taking: Reported on 1/26/2023), Disp: 30 g, Rfl: 1  •  sertraline (ZOLOFT) 25 mg tablet, Take 25 mg by mouth daily (Patient not taking: Reported on 1/26/2023), Disp: , Rfl:   No Known Allergies  Social History     Socioeconomic History   • Marital status: /Civil Union     Spouse name: None   • Number of children: 1   • Years of education: None   • Highest education level: None   Occupational History   • None   Tobacco Use   • Smoking status: Never   • Smokeless tobacco: Never   Vaping Use   • Vaping Use: Never used   Substance and Sexual Activity   • Alcohol use: No   • Drug use: No   • Sexual activity: Yes     Partners: Male     Comment: oral contraceptive prescribe last assessed 05/16/2017   Other Topics Concern   • None   Social History Narrative    Always uses seat belt    Caffeine use    [employer] affiliation     Social Determinants of Health     Financial Resource Strain: Not on file   Food Insecurity: Not on file   Transportation Needs: Not on file   Physical Activity: Not on file   Stress: Not on file   Social Connections: Not on file   Intimate Partner Violence: Not on file   Housing Stability: Not on file     Family History   Problem Relation Age of Onset   • Diabetes Mother    • Hypertension Mother    • Diabetes Father    • Heart disease Father    • Heart attack Paternal Grandmother    • Heart attack Paternal Grandfather    • Heart disease Other    • Heart disease Other    • No Known Problems Maternal Grandmother    • No Known Problems Sister    • No Known Problems Maternal Aunt    • No Known Problems Maternal Aunt    • No Known Problems Maternal Aunt    • No Known Problems Maternal Aunt    • No Known Problems Maternal Aunt    • No Known Problems Maternal Aunt    • Ovarian cancer Paternal Aunt 40   • No Known Problems Paternal Aunt    • No Known Problems Paternal Aunt    • No Known Problems Paternal Aunt    • No Known Problems Paternal Aunt    • No Known Problems Paternal Aunt    • No Known Problems Sister    • No Known Problems Maternal Grandfather        Review of Systems    Objective   Physical Exam  OBGyn Exam     Objective      /82 (BP Location: Right arm, Patient Position: Sitting)   Ht 5' 2" (1 575 m)   Wt 124 kg (273 lb 9 6 oz)   BMI 50 04 kg/m²     General:   alert and oriented, in no acute distress   Neck: normal to inspection and palpation   Breast: normal appearance, no masses or tenderness   Heart:    Lungs:    Abdomen: soft, non-tender, without masses or organomegaly   Vulva: normal   Vagina:  Mildly atrophic, without erythema or lesions or discharge  Cervix:  Mildly atrophic, without erythema or lesions or discharge or cervicitis  No CMT     Uterus: top normal size, mid-position, non-tender   Adnexa: no mass, fullness, tenderness   Rectum: Deferred, patient declined    Psych:  Normal mood and affect   Skin:  Without obvious lesions   Eyes: symmetric, with normal movements and reactivity   Musculoskeletal:  Normal muscle tone and movements appreciated

## 2023-01-31 LAB
LAB AP GYN PRIMARY INTERPRETATION: NORMAL
Lab: NORMAL

## 2023-02-16 ENCOUNTER — TELEPHONE (OUTPATIENT)
Dept: GYNECOLOGY | Facility: CLINIC | Age: 53
End: 2023-02-16

## 2023-02-16 NOTE — TELEPHONE ENCOUNTER
Patient called to follow up from her 1/26/23 Yearly exam   She reports that she had irregular bleeding again from 2/1/23 to 2/4/23  Dr Oracio Lomas told her to call if this occurs

## 2023-02-16 NOTE — TELEPHONE ENCOUNTER
Patient informed  All instructions given  Will go for blood work today  Will await Frank's call to schedule Sono and EMB

## 2023-02-17 ENCOUNTER — PROCEDURE VISIT (OUTPATIENT)
Dept: GYNECOLOGY | Facility: CLINIC | Age: 53
End: 2023-02-17

## 2023-02-17 ENCOUNTER — ULTRASOUND (OUTPATIENT)
Dept: GYNECOLOGY | Facility: CLINIC | Age: 53
End: 2023-02-17

## 2023-02-17 DIAGNOSIS — N92.6 IRREGULAR UTERINE BLEEDING: Primary | ICD-10-CM

## 2023-02-17 DIAGNOSIS — N92.6 IRREGULAR MENSES: Primary | ICD-10-CM

## 2023-02-17 DIAGNOSIS — N84.0 ENDOMETRIAL POLYP: ICD-10-CM

## 2023-02-17 DIAGNOSIS — Z30.41 ENCOUNTER FOR SURVEILLANCE OF CONTRACEPTIVE PILLS: ICD-10-CM

## 2023-02-17 RX ORDER — ACETAMINOPHEN AND CODEINE PHOSPHATE 120; 12 MG/5ML; MG/5ML
1 SOLUTION ORAL DAILY
Qty: 84 TABLET | Refills: 3 | Status: SHIPPED | OUTPATIENT
Start: 2023-02-17

## 2023-02-17 NOTE — PATIENT INSTRUCTIONS
No outpatient medications have been marked as taking for the 2/17/23 encounter (Procedure visit) with Cheri Aponte MD

## 2023-02-17 NOTE — PROGRESS NOTES
AMB US Pelvic Non OB    Date/Time: 2/17/2023 8:44 AM  Performed by: Carolyn Jack  Authorized by: Ashlyn Schmidt MD   Universal Protocol:  Patient identity confirmed: verbally with patient      Procedure details:     Technique:  Transvaginal US, Non-OB    Position: lithotomy exam    Uterine findings:     Length (cm): 11 2    Height (cm):  4 88    Width (cm):  5 01    Endometrial stripe: identified      Endometrium thickness (mm):  8 8  Left ovary findings:     Left ovary:  Visualized    Length (cm): 2 97    Height (cm): 2 05    Width (cm): 2 15  Right ovary findings:     Right ovary:  Visualized    Length (cm): 2 63    Height (cm): 1 6    Width (cm): 1 93  Other findings:     Free pelvic fluid: not identified      Free peritoneal fluid: not identified    Post-Procedure Details:     Impression:   Anteflexed uterus is inhomogeneous throughout without distinct fibroids  The bilateral ovaries appear within normal limits where seen  No free fluid  Patient body habitus again limits the exam      Tolerance: Tolerated well, no immediate complications    Complications: no complications    Additional Procedure Comments:      US PREVENTIVE MEDICINE F8 E8C-RS transvaginal transducer Serial # [id number] was used to perform the examination today and subsequently followed with high level disinfection utilizing Trophon EPR procedure  Ultrasound performed at:     [address] [address]  Phone:  [phone]  Fax:  [phone]  Sonohysterogram    Date/Time: 2/17/2023 8:52 AM  Performed by: Ashlyn Schmidt MD  Authorized by: Ashlyn Schmidt MD   Universal Protocol:  Consent given by: patient  Patient identity confirmed: verbally with patient      Pre-procedure:     Prepped with: Hibiclens    Procedure:     Cervix cleaned and prepped: yes      Uterus sounded: yes      Catheter inserted: yes      Uterine cavity distended with saline: yes    Post-procedure:     Patient observed: yes      Post procedure instructions given to patient: yes      Patient tolerated procedure well with no complications: yes    Comments:      Sonohysterogram demonstrates a polyp within the endometrium  Endometrial biopsy    Date/Time: 2/17/2023 8:52 AM  Performed by: Connor Chambers MD  Authorized by: Connor Chambers MD   Universal Protocol:  Consent given by: patient  Patient identity confirmed: verbally with patient      Procedure:     Procedure: endometrial biopsy with Pipelle      A bivalve speculum was placed in the vagina: yes      Cervix cleaned and prepped: yes      Specimen collected: specimen collected and sent to pathology      Patient tolerated procedure well with no complications: yes    AMB US Pelvic Non OB    Date/Time: 2/17/2023 9:45 AM  Performed by: Kevin Jackson  Authorized by: Connor Chambers MD   Universal Protocol:  Patient identity confirmed: verbally with patient      Procedure details:     Technique:  US Pelvic, Non-OB with complete exam    Position: supine exam    Uterine findings:     Endometrial stripe: identified    Post-Procedure Details:     Impression:  Ultrasound performed transabdominally due to anteflexion  Please see TVS for measurements and findings  Tolerance:   Tolerated well, no immediate complications    Complications: no complications    Additional Procedure Comments:      Patient body habitus limits the exam

## 2023-02-17 NOTE — PROGRESS NOTES
Assessment/Plan   Diagnoses and all orders for this visit:    Irregular menses    Endometrial polyp    Encounter for surveillance of contraceptive pills  -     norethindrone (MICRONOR) 0 35 MG tablet; Take 1 tablet (0 35 mg total) by mouth daily    1  irregular menses/possible postmenopausal bleeding- was on minipill with 1 year amenorrhea  Stop this October 2022  Had 2 days of brown spotting on 12/23 and 12/24/2022  Had 4 days of spotting last month  Possibly, this could represent her menses although it also could represent postmenopausal bleeding  Sonohysterogram today demonstrated what appeared to be endometrial polyp  Endometrial biopsy was done and we will inform the patient of the findings  Recommend hysteroscopy, D&C, with endometrial polypectomy  Risk and benefits of the procedure were discussed and all questions were answered  Consent was signed, the patient will be scheduled for the surgery in the near future  We did discuss endometrial ablation, we will defer this at this time  2  previous history of menometrorrhagia- denies any heavy bleeding at this time  3  contraception- was on minipill, stopped October 2022 after greater than 12 months of amenorrhea  Given that she is now having bleeding in a near cyclical manner, she would like to consider going back on the minipill  I would agree with this  Electronic prescription for 3 packs refill 3 was sent to local pharmacy  She does plan to get the laboratory testing ordered previously for [facility], estradiol, CBC, and hCG  4  history of diabetes/hypertension-follow-up treating doctor  Most recent hemoglobin A1c was 7 7   5  vaginal atrophy-mild changes noted on exam   6   History of yeast/external hemorrhoids-no current concerns  7  Elevated BMI  8  Terrellell Hospers going to college next year  Subjective   Patient ID: Charlene Pompa is a 46 y o  female  There were no vitals filed for this visit    Patient was seen today for sonohysterogram with endometrial biopsy and visit with me  Please see assessment plan for details        The following portions of the patient's history were reviewed and updated as appropriate: allergies, current medications, past family history, past medical history, past social history, past surgical history and problem list   Past Medical History:   Diagnosis Date   • Abnormal Pap smear of cervix    • Anemia    • Arthritis    • Decreased libido    • Diabetes mellitus ([address] )    • Female infertility    • HPV (human papilloma virus) infection    • Hypertension    • Ovarian cyst    • Polycystic ovarian syndrome    • Urinary tract infection    • Uterine leiomyoma     resolved 2017     Past Surgical History:   Procedure Laterality Date   •  SECTION     • DILATION AND CURETTAGE OF UTERUS     • ENDOMETRIAL BIOPSY      Onset  by suction   • LAPAROSCOPIC GASTRIC BANDING     • [place] HYSTEROSCOPY BX ENDOMETRIUM&/POLYPC W/WO D&C N/A 5/3/2017    Procedure: HYSTEROSCOPY; D&C; ENDOMETRIAL POLYPECTOMY ;  Surgeon: Shan Sawyer MD;  Location: [facility];  Service: Gynecology     OB History    Para Term  AB Living   1 1 1     1   SAB IAB Ectopic Multiple Live Births           1      # Outcome Date GA Lbr Jorje/2nd Weight Sex Delivery Anes PTL Lv   1 Term                Current Outpatient Medications:   •  norethindrone (MICRONOR) 0 35 MG tablet, Take 1 tablet (0 35 mg total) by mouth daily, Disp: 84 tablet, Rfl: 3  •  Aspirin 81 MG CAPS, Take by mouth, Disp: , Rfl:   •  atorvastatin (LIPITOR) 10 mg tablet, Take 1 tablet by mouth every evening, Disp: , Rfl:   •  cetirizine (ZyrTEC) 10 mg tablet, Take by mouth, Disp: , Rfl:   •  chlorthalidone 25 mg tablet, Take 25 mg by mouth daily, Disp: , Rfl:   •  Cholecalciferol (VITAMIN D-3 PO), Take by mouth, Disp: , Rfl:   •  Empagliflozin (Jardiance) 25 MG TABS, Take 25 mg by mouth every morning (Patient not taking: Reported on 2022), Disp: , Rfl:   • lisinopril (ZESTRIL) 20 mg tablet, Take by mouth (Patient not taking: Reported on 11/16/2021), Disp: , Rfl:   •  LORazepam (ATIVAN) 0 5 mg tablet, Take 0 5 mg by mouth every 6 (six) hours as needed for anxiety, Disp: , Rfl:   •  losartan (COZAAR) 100 MG tablet, Take 100 mg by mouth daily (Patient not taking: Reported on 1/26/2023), Disp: , Rfl:   •  losartan (COZAAR) 50 mg tablet, Take 100 mg by mouth daily  , Disp: , Rfl:   •  meloxicam (MOBIC) 15 mg tablet, Take 15 mg by mouth daily, Disp: , Rfl:   •  metFORMIN (GLUCOPHAGE) 500 mg tablet, Take 500 mg by mouth 2 (two) times a day with meals, Disp: , Rfl:   •  metFORMIN (GLUCOPHAGE-XR) 500 mg 24 hr tablet, Take 500 mg by mouth 2 (two) times a day with meals (Patient not taking: Reported on 1/26/2023), Disp: , Rfl:   •  nystatin-triamcinolone (MYCOLOG-II) cream, Apply topically 2 (two) times a day as needed (Itching/irritation) (Patient not taking: Reported on 1/26/2023), Disp: 30 g, Rfl: 1  •  sertraline (ZOLOFT) 25 mg tablet, Take 25 mg by mouth daily (Patient not taking: Reported on 1/26/2023), Disp: , Rfl:   •  sertraline (ZOLOFT) 50 mg tablet, Take 50 mg by mouth daily, Disp: , Rfl:   •  sitaGLIPtin (JANUVIA) 100 mg tablet, Take 100 mg by mouth in the morning , Disp: , Rfl:   No Known Allergies  Social History     Socioeconomic History   • Marital status: /Civil Union     Spouse name: Not on file   • Number of children: 1   • Years of education: Not on file   • Highest education level: Not on file   Occupational History   • Not on file   Tobacco Use   • Smoking status: Never   • Smokeless tobacco: Never   Vaping Use   • Vaping Use: Never used   Substance and Sexual Activity   • Alcohol use: No   • Drug use: No   • Sexual activity: Yes     Partners: Male     Comment: oral contraceptive prescribe last assessed 05/16/2017   Other Topics Concern   • Not on file   Social History Narrative    Always uses seat belt    Caffeine use    [employer]angel[employer] affiliation Social Determinants of Health     Financial Resource Strain: Not on file   Food Insecurity: Not on file   Transportation Needs: Not on file   Physical Activity: Not on file   Stress: Not on file   Social Connections: Not on file   Intimate Partner Violence: Not on file   Housing Stability: Not on file     Family History   Problem Relation Age of Onset   • Diabetes Mother    • Hypertension Mother    • Diabetes Father    • Heart disease Father    • Heart attack Paternal Grandmother    • Heart attack Paternal Grandfather    • Heart disease Other    • Heart disease Other    • No Known Problems Maternal Grandmother    • No Known Problems Sister    • No Known Problems Maternal Aunt    • No Known Problems Maternal Aunt    • No Known Problems Maternal Aunt    • No Known Problems Maternal Aunt    • No Known Problems Maternal Aunt    • No Known Problems Maternal Aunt    • Ovarian cancer Paternal Aunt 36   • No Known Problems Paternal Aunt    • No Known Problems Paternal Aunt    • No Known Problems Paternal Aunt    • No Known Problems Paternal Aunt    • No Known Problems Paternal Aunt    • No Known Problems Sister    • No Known Problems Maternal Grandfather        Review of Systems   Constitutional: Negative for chills, diaphoresis, fatigue and fever  Respiratory: Negative for apnea, cough, chest tightness, shortness of breath and wheezing  Cardiovascular: Negative for chest pain, palpitations and leg swelling  Gastrointestinal: Negative for abdominal distention, abdominal pain, anal bleeding, constipation, diarrhea, nausea, rectal pain and vomiting  Genitourinary: Negative for difficulty urinating, dyspareunia, dysuria, frequency, hematuria, menstrual problem, pelvic pain, urgency, vaginal bleeding, vaginal discharge and vaginal pain  Musculoskeletal: Negative for arthralgias, back pain and myalgias  Skin: Negative for color change and rash     Neurological: Negative for dizziness, syncope, light-headedness, numbness and headaches  Hematological: Negative for adenopathy  Does not bruise/bleed easily  Psychiatric/Behavioral: Negative for dysphoric mood and sleep disturbance  The patient is not nervous/anxious  Objective   Physical Exam  OBGyn Exam     Objective      There were no vitals taken for this visit  General:   alert and oriented, in no acute distress   Neck:    Breast:    Heart:  Regular rate and rhythm   Lungs:  Clear to auscultation   Abdomen: soft, non-tender, without masses or organomegaly   Vulva: normal   Vagina: Without erythema or lesions or discharge  Normal   Cervix: Without lesions or discharge or cervicitis  No Cervical motion tenderness    Cervix is quite anteflexed   Uterus: top normal size, anteverted, non-tender   Adnexa: no mass, fullness, tenderness   Rectum: deferred    Psych:  Normal mood and affect   Skin:  Without obvious lesions   Eyes: symmetric, with normal movements and reactivity   Musculoskeletal:  Normal muscle tone and movements appreciated

## 2023-02-23 ENCOUNTER — TELEPHONE (OUTPATIENT)
Dept: GYNECOLOGY | Facility: CLINIC | Age: 53
End: 2023-02-23

## 2023-02-23 NOTE — TELEPHONE ENCOUNTER
----- Message from Fany Odonnell sent at 2/17/2023  4:22 PM EST -----    ----- Message -----  From: Bruna De Leon MD  Sent: 2/17/2023  11:01 AM EST  To: Ranjit Taylor MA, #    [facility]

## 2023-03-07 ENCOUNTER — TELEPHONE (OUTPATIENT)
Dept: GYNECOLOGY | Facility: CLINIC | Age: 53
End: 2023-03-07

## 2023-03-07 NOTE — TELEPHONE ENCOUNTER
Per Rai Diana on 3/7/2023 at 1:50pm no prior authorization needed for cpt codes 88223,11493 being performed on 3/28/2023      [facility]-[id number]

## 2023-03-24 ENCOUNTER — TELEPHONE (OUTPATIENT)
Dept: GYNECOLOGY | Facility: CLINIC | Age: 53
End: 2023-03-24

## 2023-03-24 NOTE — TELEPHONE ENCOUNTER
Called patient to see if she completed her PAT  She went today to [facility] [address]  Informed PAT @ [facility]

## 2023-03-25 ENCOUNTER — ANESTHESIA EVENT (OUTPATIENT)
Dept: PERIOP | Facility: HOSPITAL | Age: 53
End: 2023-03-25

## 2023-03-27 ENCOUNTER — TELEPHONE (OUTPATIENT)
Dept: GYNECOLOGY | Facility: CLINIC | Age: 53
End: 2023-03-27

## 2023-03-27 NOTE — TELEPHONE ENCOUNTER
Per Alka Interiano RN, [address] PAT, patient's PAT lab results and EKG scanned into patient's chart  Patient informed

## 2023-03-28 ENCOUNTER — ANESTHESIA (OUTPATIENT)
Dept: PERIOP | Facility: HOSPITAL | Age: 53
End: 2023-03-28

## 2023-03-28 ENCOUNTER — HOSPITAL ENCOUNTER (OUTPATIENT)
Facility: HOSPITAL | Age: 53
Setting detail: OUTPATIENT SURGERY
Discharge: HOME | End: 2023-03-28
Attending: OBSTETRICS & GYNECOLOGY | Admitting: OBSTETRICS & GYNECOLOGY

## 2023-03-28 VITALS
WEIGHT: 275.8 LBS | OXYGEN SATURATION: 97 % | RESPIRATION RATE: 14 BRPM | TEMPERATURE: 97.9 F | HEIGHT: 62 IN | HEART RATE: 84 BPM | SYSTOLIC BLOOD PRESSURE: 114 MMHG | DIASTOLIC BLOOD PRESSURE: 60 MMHG | BODY MASS INDEX: 50.75 KG/M2

## 2023-03-28 DIAGNOSIS — N84.0 ENDOMETRIAL POLYP: ICD-10-CM

## 2023-03-28 DIAGNOSIS — N92.6 IRREGULAR MENSES: ICD-10-CM

## 2023-03-28 PROBLEM — E66.01 MORBID OBESITY WITH BMI OF 50.0-59.9, ADULT (HCC): Status: ACTIVE | Noted: 2023-03-28

## 2023-03-28 LAB
EXT PREGNANCY TEST URINE: NEGATIVE
EXT PREGNANCY TEST URINE: NEGATIVE
EXT. CONTROL: NORMAL
EXT. CONTROL: NORMAL
GLUCOSE SERPL-MCNC: 156 MG/DL (ref 65–140)
GLUCOSE SERPL-MCNC: 180 MG/DL (ref 65–140)

## 2023-03-28 RX ORDER — SODIUM CHLORIDE, SODIUM LACTATE, POTASSIUM CHLORIDE, CALCIUM CHLORIDE 600; 310; 30; 20 MG/100ML; MG/100ML; MG/100ML; MG/100ML
125 INJECTION, SOLUTION INTRAVENOUS CONTINUOUS
Status: DISCONTINUED | OUTPATIENT
Start: 2023-03-28 | End: 2023-03-28

## 2023-03-28 RX ORDER — LIDOCAINE HYDROCHLORIDE 10 MG/ML
INJECTION, SOLUTION EPIDURAL; INFILTRATION; INTRACAUDAL; PERINEURAL AS NEEDED
Status: DISCONTINUED | OUTPATIENT
Start: 2023-03-28 | End: 2023-03-28

## 2023-03-28 RX ORDER — ONDANSETRON 2 MG/ML
4 INJECTION INTRAMUSCULAR; INTRAVENOUS ONCE AS NEEDED
Status: COMPLETED | OUTPATIENT
Start: 2023-03-28 | End: 2023-03-28

## 2023-03-28 RX ORDER — EPHEDRINE SULFATE 50 MG/ML
INJECTION INTRAVENOUS AS NEEDED
Status: DISCONTINUED | OUTPATIENT
Start: 2023-03-28 | End: 2023-03-28

## 2023-03-28 RX ORDER — IBUPROFEN 600 MG/1
600 TABLET ORAL EVERY 6 HOURS PRN
Status: DISCONTINUED | OUTPATIENT
Start: 2023-03-28 | End: 2023-03-28 | Stop reason: HOSPADM

## 2023-03-28 RX ORDER — ONDANSETRON 2 MG/ML
INJECTION INTRAMUSCULAR; INTRAVENOUS AS NEEDED
Status: DISCONTINUED | OUTPATIENT
Start: 2023-03-28 | End: 2023-03-28

## 2023-03-28 RX ORDER — ACETAMINOPHEN 325 MG/1
975 TABLET ORAL EVERY 6 HOURS PRN
Status: DISCONTINUED | OUTPATIENT
Start: 2023-03-28 | End: 2023-03-28 | Stop reason: HOSPADM

## 2023-03-28 RX ORDER — ONDANSETRON 2 MG/ML
4 INJECTION INTRAMUSCULAR; INTRAVENOUS EVERY 6 HOURS PRN
Status: DISCONTINUED | OUTPATIENT
Start: 2023-03-28 | End: 2023-03-28 | Stop reason: HOSPADM

## 2023-03-28 RX ORDER — PROPOFOL 10 MG/ML
INJECTION, EMULSION INTRAVENOUS AS NEEDED
Status: DISCONTINUED | OUTPATIENT
Start: 2023-03-28 | End: 2023-03-28

## 2023-03-28 RX ORDER — SODIUM CHLORIDE 9 MG/ML
INJECTION, SOLUTION INTRAVENOUS AS NEEDED
Status: DISCONTINUED | OUTPATIENT
Start: 2023-03-28 | End: 2023-03-28 | Stop reason: HOSPADM

## 2023-03-28 RX ORDER — MIDAZOLAM HYDROCHLORIDE 2 MG/2ML
INJECTION, SOLUTION INTRAMUSCULAR; INTRAVENOUS AS NEEDED
Status: DISCONTINUED | OUTPATIENT
Start: 2023-03-28 | End: 2023-03-28

## 2023-03-28 RX ORDER — SUCCINYLCHOLINE/SOD CL,ISO/PF 100 MG/5ML
SYRINGE (ML) INTRAVENOUS AS NEEDED
Status: DISCONTINUED | OUTPATIENT
Start: 2023-03-28 | End: 2023-03-28

## 2023-03-28 RX ORDER — FENTANYL CITRATE 50 UG/ML
INJECTION, SOLUTION INTRAMUSCULAR; INTRAVENOUS AS NEEDED
Status: DISCONTINUED | OUTPATIENT
Start: 2023-03-28 | End: 2023-03-28

## 2023-03-28 RX ORDER — MEPERIDINE HYDROCHLORIDE 25 MG/ML
12.5 INJECTION INTRAMUSCULAR; INTRAVENOUS; SUBCUTANEOUS
Status: DISCONTINUED | OUTPATIENT
Start: 2023-03-28 | End: 2023-03-28 | Stop reason: HOSPADM

## 2023-03-28 RX ORDER — FENTANYL CITRATE/PF 50 MCG/ML
25 SYRINGE (ML) INJECTION
Status: DISCONTINUED | OUTPATIENT
Start: 2023-03-28 | End: 2023-03-28 | Stop reason: HOSPADM

## 2023-03-28 RX ADMIN — SODIUM CHLORIDE, SODIUM LACTATE, POTASSIUM CHLORIDE, AND CALCIUM CHLORIDE 125 ML/HR: .6; .31; .03; .02 INJECTION, SOLUTION INTRAVENOUS at 08:03

## 2023-03-28 RX ADMIN — ONDANSETRON 4 MG: 2 INJECTION INTRAMUSCULAR; INTRAVENOUS at 09:02

## 2023-03-28 RX ADMIN — PROPOFOL 200 MG: 10 INJECTION, EMULSION INTRAVENOUS at 08:45

## 2023-03-28 RX ADMIN — FENTANYL CITRATE 25 MCG: 50 INJECTION INTRAMUSCULAR; INTRAVENOUS at 09:36

## 2023-03-28 RX ADMIN — LIDOCAINE HYDROCHLORIDE 100 MG: 10 INJECTION, SOLUTION EPIDURAL; INFILTRATION; INTRACAUDAL; PERINEURAL at 08:45

## 2023-03-28 RX ADMIN — Medication 140 MG: at 08:45

## 2023-03-28 RX ADMIN — EPHEDRINE SULFATE 5 MG: 50 INJECTION, SOLUTION INTRAVENOUS at 08:48

## 2023-03-28 RX ADMIN — EPHEDRINE SULFATE 5 MG: 50 INJECTION, SOLUTION INTRAVENOUS at 09:05

## 2023-03-28 RX ADMIN — ONDANSETRON HYDROCHLORIDE 4 MG: 2 SOLUTION INTRAMUSCULAR; INTRAVENOUS at 10:34

## 2023-03-28 RX ADMIN — FENTANYL CITRATE 100 MCG: 50 INJECTION INTRAMUSCULAR; INTRAVENOUS at 08:45

## 2023-03-28 RX ADMIN — PROPOFOL 50 MG: 10 INJECTION, EMULSION INTRAVENOUS at 09:19

## 2023-03-28 RX ADMIN — PROPOFOL 50 MG: 10 INJECTION, EMULSION INTRAVENOUS at 08:54

## 2023-03-28 RX ADMIN — MIDAZOLAM 2 MG: 1 INJECTION INTRAMUSCULAR; INTRAVENOUS at 08:37

## 2023-03-28 RX ADMIN — SODIUM CHLORIDE, SODIUM LACTATE, POTASSIUM CHLORIDE, AND CALCIUM CHLORIDE 125 ML/HR: .6; .31; .03; .02 INJECTION, SOLUTION INTRAVENOUS at 10:36

## 2023-03-28 NOTE — OP NOTE
OPERATIVE REPORT  PATIENT NAME: Pepe Gonsalez    :  1970  MRN: [id number]  Pt Location: [facility] ROOM 04    SURGERY DATE: 3/28/2023    Surgeon(s) and Role:     * Kirk Andrea MD - Primary     * Kee He MD - Assisting    Preop Diagnosis:  Irregular menses [N92 6]  Endometrial polyp [N84 0]    Post-Op Diagnosis Codes:     * Irregular menses [N92 6]     * Endometrial polyp [N84 0]    Procedure(s):  (D&C) W/ HYSTEROSCOPY  (EUA)  Endometrial Polypectomy    Specimen(s):  ID Type Source Tests Collected by Time Destination   1 : Endometrial curettings with polyp Tissue Endometrium TISSUE EXAM Kirk Andrea MD 3/28/2023 3938        Estimated Blood Loss:   Minimal    Drains:  * No LDAs found *    Anesthesia Type:   General    Operative Indications:  Irregular menses [N92 6]  Endometrial polyp [N84 0]      Operative Findings:  Normal external genitalia  Cervix was anterior and appeared normal  There was a large endometrial polyp filling the entire uterine cavity with the stalk at the 3 o'clock position  Entire polyp was removed and uterine cavity then appeared normal with bilateral ostia visualized  Complications:   None    Procedure and Technique:    Patient was taken to the operating room  General LMA anesthesia (LMA) was administered and the patient was positioned on the OR table in the dorsal lithotomy position  All pressure points were padded and a saray hugger was placed to maintain control of core body temperature  The patient was prepped and draped in the usual sterile fashion  A time out was performed to confirm correct patient and correct procedure  A straight catheter was introduced into the bladder, which was drained of 100cc of clear yellow urine  A weighted speculum was inserted into the vagina and a Hargrove retractor was used to visualize the anterior lip of the cervix, which was then grasped with a single toothed tenaculum  The uterus was sounded to 10cm   The cervix was serially dilated to 21 using Pardeep dilators for introduction of the hysteroscope  Hysteroscope was introduced under direct visualization using normal saline solution as the distention media  Hysteroscope was advanced to the uterine fundus and the entire uterine cavity was inspected in a systematic manner  There was noted to be a large endometrial polyp  Hysteroscope was withdrawn and sharp curetting was performed, starting at the 12'oclock position and rotating a total of 360 degrees to cover all surfaces  Endometrial tissue was obtained and sent for pathology, however there was only a small amount of specimen obtained  The Symphion hysteroscope was then introduced under direct visualization, again using normal saline solution as the distention media  The polyp was easily resected with the device  The cavity was inspected and normal anatomy identified  Hysteroscopy was withdrawn  The single toothed tenaculum was removed from the anterior lip of the cervix  Good hemostasis was confirmed at the tenaculum puncture sites  Weighted speculum was then removed from the vagina  At the conclusion of the procedure, all needle, sponge, and instrument counts were noted to be correct x2  Dr Rubén Elizabeth was present and participated in all key portions of the case            Patient Disposition:  PACU         SIGNATURE: Eric Castañeda MD  DATE: March 28, 2023  TIME: 10:46 AM

## 2023-03-28 NOTE — H&P
Assessment/Plan   Pravin Johnson is a 60-year-old woman with irregular menses/possible postmenopausal bleeding with endometrial polyp who presents for hysteroscopy with D&C with polypectomy  Subjective   Patient ID: David Vidal is a 46 y o  female  There were no vitals filed for this visit  HPI  Pravin Johnson is a 60-year-old woman followed at [facility] for some time now  She presented for yearly exam on 1/26/2023 with irregular menses  She was on minipill, stopped this October 2022  She had 2 days of brown spotting on 12/23 and 12/24/2022 followed by 4 days of spotting January 2023  He did have greater than 1 year of amenorrhea, but this was mostly on the minipill which she took for contraception  She underwent sonohysterogram with endometrial biopsy on 2/17/2023  Findings notable for normal-appearing ultrasound, sonohysterogram with endometrial polyp in the endometrium with endometrial biopsy with rare endometrial endocervical gland fragments insufficient for evaluation  The patient was counseled about the findings  She is interested in proceeding with evaluation and this will be done today with hysteroscopy with D&C with endometrial polypectomy  Risk and benefits of the procedure were discussed at the time of office visit and consent was signed at that time  She presents today for this procedure    Other notable history-prior history of menometrorrhagia  Contraception-now back on minipill  History of diabetes/hypertension  Mild vaginal atrophy  History yeast infection/external hemorrhoids  Elevated BMI    The following portions of the patient's history were reviewed and updated as appropriate: allergies, current medications, past family history, past medical history, past social history, past surgical history and problem list   Past Medical History:   Diagnosis Date   • Abnormal Pap smear of cervix    • Anemia    • Arthritis    • Decreased libido    • Diabetes mellitus ([address] )    • Female infertility • HPV (human papilloma virus) infection    • Hypertension    • Ovarian cyst    • Polycystic ovarian syndrome    • Urinary tract infection    • Uterine leiomyoma     resolved 2017     Past Surgical History:   Procedure Laterality Date   •  SECTION     • DILATION AND CURETTAGE OF UTERUS     • ENDOMETRIAL BIOPSY      Onset  by suction   • LAPAROSCOPIC GASTRIC BANDING     • IN HYSTEROSCOPY BX ENDOMETRIUM&/POLYPC W/WO D&C N/A 5/3/2017    Procedure: HYSTEROSCOPY; D&C; ENDOMETRIAL POLYPECTOMY ;  Surgeon: Jackelin Valdez MD;  Location: [facility];  Service: Gynecology     OB History    Para Term  AB Living   1 1 1     1   SAB IAB Ectopic Multiple Live Births           1      # Outcome Date GA Lbr Jorje/2nd Weight Sex Delivery Anes PTL Lv   1 Term              No current facility-administered medications for this encounter    No Known Allergies  Social History     Socioeconomic History   • Marital status: /Civil Union     Spouse name: Not on file   • Number of children: 1   • Years of education: Not on file   • Highest education level: Not on file   Occupational History   • Not on file   Tobacco Use   • Smoking status: Never   • Smokeless tobacco: Never   Vaping Use   • Vaping Use: Never used   Substance and Sexual Activity   • Alcohol use: No   • Drug use: No   • Sexual activity: Yes     Partners: Male     Comment: oral contraceptive prescribe last assessed 2017   Other Topics Concern   • Not on file   Social History Narrative    Always uses seat belt    Caffeine use    Faith affiliation     Social Determinants of Health     Financial Resource Strain: Not on file   Food Insecurity: Not on file   Transportation Needs: Not on file   Physical Activity: Not on file   Stress: Not on file   Social Connections: Not on file   Intimate Partner Violence: Not on file   Housing Stability: Not on file     Family History   Problem Relation Age of Onset   • Diabetes Mother    • Hypertension Mother    • Diabetes Father    • Heart disease Father    • Heart attack Paternal Grandmother    • Heart attack Paternal Grandfather    • Heart disease Other    • Heart disease Other    • No Known Problems Maternal Grandmother    • No Known Problems Sister    • No Known Problems Maternal Aunt    • No Known Problems Maternal Aunt    • No Known Problems Maternal Aunt    • No Known Problems Maternal Aunt    • No Known Problems Maternal Aunt    • No Known Problems Maternal Aunt    • Ovarian cancer Paternal Aunt 44   • No Known Problems Paternal Aunt    • No Known Problems Paternal Aunt    • No Known Problems Paternal Aunt    • No Known Problems Paternal Aunt    • No Known Problems Paternal Aunt    • No Known Problems Sister    • No Known Problems Maternal Grandfather        Review of Systems    Objective   Physical Exam  OBGyn Exam     Objective      There were no vitals taken for this visit  General:   alert and oriented, in no acute distress   Neck:    Breast:    Heart: regular rate and rhythm, S1, S2 normal, no murmur, click, rub or gallop   Lungs: clear to auscultation bilaterally   Abdomen: soft, non-tender, without masses or organomegaly   Vulva: normal   Vagina: Without erythema or lesions or discharge  Normal with mild vaginal atrophy   Cervix:  Mildly atrophic, without erythema or lesions or discharge  Cervix is quite anteflexed     Uterus: top normal size, anteverted, non-tender   Adnexa: no mass, fullness, tenderness   Rectum: deferred    Psych:  Normal mood and affect   Skin:  Without obvious lesions   Eyes: symmetric, with normal movements and reactivity   Musculoskeletal:  Normal muscle tone and movements appreciated

## 2023-03-28 NOTE — ANESTHESIA POSTPROCEDURE EVALUATION
"Post-Op Assessment Note    CV Status:  Stable  Pain Score: 2    Pain management: adequate     Mental Status:  Alert and awake   Hydration Status:  Euvolemic and stable   PONV Controlled:  Controlled   Airway Patency:  Patent      Post Op Vitals Reviewed: Yes      Staff: Anesthesiologist         No notable events documented      BP      Temp      Pulse     Resp      SpO2      /60   Pulse 84   Temp 97 9 °F (36 6 °C) (Temporal)   Resp 14   Ht 5' 2\" (1 575 m)   Wt 125 kg (275 lb 12 7 oz)   LMP  (LMP Unknown)   SpO2 97%   BMI 50 44 kg/m²     "

## 2023-03-28 NOTE — LETTER
[address] [id number]  Dept: [phone]    March 28, 2023     Patient: Caremn Werner   YOB: 1970   Date of Visit: 3/28/2023       To Whom it May Concern:    Carmen Werner is under my professional care  She was seen in the hospital for a surgical procedure on 3/28/23  She should be excused from work for 3/27/23-3/31/23  If you have any questions or concerns, please don't hesitate to call           Sincerely,     Russ Leggett MD

## 2023-03-28 NOTE — ANESTHESIA PREPROCEDURE EVALUATION
"Procedure:  (D&C) W/ HYSTEROSCOPY (Uterus)  (EUA) (Pelvis)  Endometrial Polypectomy (Uterus)    Relevant Problems   CARDIO   (+) Hypertension      HEMATOLOGY   (+) Anemia      Endocrine   (+) Diabetes mellitus (HCC)   (+) Polycystic ovarian syndrome      Other   (+) Morbid obesity with BMI of 50 0-59 9, adult (HCC)        Physical Exam    Airway    Mallampati score: III  TM Distance: >3 FB  Neck ROM: full     Dental   No notable dental hx     Cardiovascular  Rhythm: regular, Rate: normal, Cardiovascular exam normal    Pulmonary  Pulmonary exam normal Breath sounds clear to auscultation,     Other Findings        Anesthesia Plan  ASA Score- 3     Anesthesia Type- general with ASA Monitors  Additional Monitors:   Airway Plan:     Comment: Encouraged sleep study  Has awakened \"anxious\" after GA in past        Plan Factors-    Chart reviewed  Existing labs reviewed  Patient summary reviewed  Patient is not a current smoker  Patient not instructed to abstain from smoking on day of procedure  Patient did not smoke on day of surgery  Obstructive sleep apnea risk education given perioperatively  Induction- intravenous  Postoperative Plan-     Informed Consent- Anesthetic plan and risks discussed with patient and spouse Christopher Mina)              "

## 2023-03-30 ENCOUNTER — TELEPHONE (OUTPATIENT)
Dept: GYNECOLOGY | Facility: CLINIC | Age: 53
End: 2023-03-30

## 2024-03-01 ENCOUNTER — TELEPHONE (OUTPATIENT)
Age: 54
End: 2024-03-01

## 2024-03-01 DIAGNOSIS — Z30.41 ENCOUNTER FOR SURVEILLANCE OF CONTRACEPTIVE PILLS: ICD-10-CM

## 2024-03-01 RX ORDER — NORETHINDRONE 0.35 MG/1
1 TABLET ORAL DAILY
Qty: 84 TABLET | Refills: 3 | OUTPATIENT
Start: 2024-03-01

## 2024-03-01 RX ORDER — ACETAMINOPHEN AND CODEINE PHOSPHATE 120; 12 MG/5ML; MG/5ML
1 SOLUTION ORAL DAILY
Qty: 84 TABLET | Refills: 0 | Status: SHIPPED | OUTPATIENT
Start: 2024-03-01

## 2024-03-01 RX ORDER — ACETAMINOPHEN AND CODEINE PHOSPHATE 120; 12 MG/5ML; MG/5ML
1 SOLUTION ORAL DAILY
Qty: 84 TABLET | Refills: 3 | Status: CANCELLED | OUTPATIENT
Start: 2024-03-01

## 2024-03-01 NOTE — TELEPHONE ENCOUNTER
Pt is scheduled for her WA for April. Please send refills if needed until seen for appt. Pt aware she must be seen in order to have further refills.

## 2024-10-14 DIAGNOSIS — Z30.41 ENCOUNTER FOR SURVEILLANCE OF CONTRACEPTIVE PILLS: ICD-10-CM

## 2024-10-15 RX ORDER — ACETAMINOPHEN AND CODEINE PHOSPHATE 120; 12 MG/5ML; MG/5ML
1 SOLUTION ORAL DAILY
Qty: 84 TABLET | Refills: 0 | Status: SHIPPED | OUTPATIENT
Start: 2024-10-15

## 2024-12-03 NOTE — PROGRESS NOTES
Assessment/Plan:    Reviewed the use of Zeosorb AF powder after showering for prevention of yeast. No tinea noted today.   Will stop micronor for 3 months using condoms if sexually active. Will have FSH/LH/estradiol drawn in 3 mnths to determine ovarian function.   Recommended monthly SBE, annual CBE and annual screening mammo. ASCCP guidelines reviewed and pap with cotesting noted to be up to date; this low risk patient was advised she meets criteria to d/c pap screening at age 65. No pap done. Colonoscopy noted to be up to date. Reviewed diet/activity recommendations Calcium 1200 mg and Vit D 600-1000 IU daily.  Discussed postmenopausal considerations and symptoms to report. Kegel exercises as instructed. RTO in one year for routine annual gyn exam or sooner PRN.        Diagnoses and all orders for this visit:    Encounter for gynecological examination (general) (routine) without abnormal findings    Screening mammogram for breast cancer  -     Mammo screening bilateral w 3d and cad; Future    Menopause  -     Follicle stimulating hormone; Future  -     Luteinizing hormone; Future  -     Estradiol; Future    Other orders  -     Zepbound 10 MG/0.5ML auto-injector; INJECT 10 MG UNDER THE SKIN EVERY 7 DAYS        Subjective:      Patient ID: Jaimie Goodwin is a 54 y.o. female.    This patient presents for routine annual gyn exam.  She is s/p D&C hysteroscopy with benign polypectomy 3/28/23. She continues on Micronor.   Pt states she gets an odor under panus.  She denies  bleeding or spotting, VM sx, pelvic pain, dyspareunia, breast concerns, abnormal discharge, bowel/bladder dysfunction, depression/anx.   , sexually active and is monogamous.   Pap/HPV up to date and normal, 1/26/23.  Mammography up to date and normal, 3/20/24. Colonoscopy up to date, 7/22/21, 10 yr recall.            The following portions of the patient's history were reviewed and updated as appropriate: allergies, current  "medications, past family history, past medical history, past social history, past surgical history and problem list.    Review of Systems   Constitutional: Negative.    Respiratory: Negative.     Cardiovascular: Negative.    Gastrointestinal: Negative.    Endocrine: Negative.    Genitourinary:  Negative for dyspareunia, dysuria, frequency, pelvic pain, urgency, vaginal bleeding, vaginal discharge and vaginal pain.   Musculoskeletal: Negative.    Skin: Negative.    Neurological: Negative.    Psychiatric/Behavioral: Negative.           Objective:      /88 (BP Location: Left arm, Patient Position: Sitting, Cuff Size: Large)   Ht 5' 2.5\" (1.588 m)   Wt 112 kg (247 lb 12.8 oz)   BMI 44.60 kg/m²          Physical Exam  Vitals and nursing note reviewed. Exam conducted with a chaperone present.   Constitutional:       Appearance: Normal appearance. She is well-developed.   HENT:      Head: Normocephalic and atraumatic.   Neck:      Thyroid: No thyroid mass or thyromegaly.   Cardiovascular:      Rate and Rhythm: Normal rate and regular rhythm.      Heart sounds: Normal heart sounds.   Pulmonary:      Effort: Pulmonary effort is normal.      Breath sounds: Normal breath sounds.   Chest:   Breasts:     Breasts are symmetrical.      Right: No inverted nipple, mass, nipple discharge, skin change or tenderness.      Left: No inverted nipple, mass, nipple discharge, skin change or tenderness.   Abdominal:      General: Bowel sounds are normal.      Palpations: Abdomen is soft.      Tenderness: There is no abdominal tenderness.      Hernia: There is no hernia in the left inguinal area or right inguinal area.   Genitourinary:     General: Normal vulva.      Exam position: Supine.      Pubic Area: No rash.       Labia:         Right: No rash, tenderness, lesion or injury.         Left: No rash, tenderness, lesion or injury.       Urethra: No prolapse, urethral pain, urethral swelling or urethral lesion.      Vagina: Normal. " No signs of injury and foreign body. No vaginal discharge, erythema, tenderness, bleeding, lesions or prolapsed vaginal walls.      Cervix: No cervical motion tenderness, discharge, friability, lesion, erythema, cervical bleeding or eversion.      Uterus: Not deviated, not enlarged, not fixed, not tender and no uterine prolapse.       Adnexa:         Right: No mass, tenderness or fullness.          Left: No mass, tenderness or fullness.        Rectum: No external hemorrhoid.      Comments: Urethra normal without lesions  No bladder tenderness  Exam limited by body habiuts  Musculoskeletal:         General: Normal range of motion.      Cervical back: Normal range of motion and neck supple.   Lymphadenopathy:      Lower Body: No right inguinal adenopathy. No left inguinal adenopathy.   Skin:     General: Skin is warm and dry.   Neurological:      Mental Status: She is alert and oriented to person, place, and time.   Psychiatric:         Speech: Speech normal.         Behavior: Behavior normal. Behavior is cooperative.

## 2024-12-04 ENCOUNTER — ANNUAL EXAM (OUTPATIENT)
Dept: GYNECOLOGY | Facility: CLINIC | Age: 54
End: 2024-12-04
Payer: COMMERCIAL

## 2024-12-04 VITALS
BODY MASS INDEX: 43.91 KG/M2 | WEIGHT: 247.8 LBS | HEIGHT: 63 IN | SYSTOLIC BLOOD PRESSURE: 126 MMHG | DIASTOLIC BLOOD PRESSURE: 88 MMHG

## 2024-12-04 DIAGNOSIS — Z12.31 SCREENING MAMMOGRAM FOR BREAST CANCER: ICD-10-CM

## 2024-12-04 DIAGNOSIS — Z01.419 ENCOUNTER FOR GYNECOLOGICAL EXAMINATION (GENERAL) (ROUTINE) WITHOUT ABNORMAL FINDINGS: Primary | ICD-10-CM

## 2024-12-04 DIAGNOSIS — Z78.0 MENOPAUSE: ICD-10-CM

## 2024-12-04 PROCEDURE — S0612 ANNUAL GYNECOLOGICAL EXAMINA: HCPCS | Performed by: OBSTETRICS & GYNECOLOGY

## 2024-12-04 RX ORDER — TIRZEPATIDE 10 MG/.5ML
INJECTION, SOLUTION SUBCUTANEOUS
COMMUNITY

## 2025-02-05 ENCOUNTER — TELEPHONE (OUTPATIENT)
Dept: GYNECOLOGY | Facility: CLINIC | Age: 55
End: 2025-02-05

## 2025-02-05 NOTE — TELEPHONE ENCOUNTER
Called pt, she saw JAVIER cox on 12/4/24, per [facility] she foe snot need to be seen for her  appt, advised pt if she is having any issues or concerns about BC she can still come in for her appt if not we can cancel, advised to call back for any questions.   Yes-Patient/Caregiver accepts free interpretation services...

## 2025-03-13 ENCOUNTER — OFFICE VISIT (OUTPATIENT)
Dept: GYNECOLOGY | Facility: CLINIC | Age: 55
End: 2025-03-13
Payer: COMMERCIAL

## 2025-03-13 ENCOUNTER — APPOINTMENT (OUTPATIENT)
Dept: LAB | Facility: HOSPITAL | Age: 55
End: 2025-03-13
Payer: COMMERCIAL

## 2025-03-13 VITALS — SYSTOLIC BLOOD PRESSURE: 110 MMHG | WEIGHT: 230 LBS | DIASTOLIC BLOOD PRESSURE: 74 MMHG | BODY MASS INDEX: 41.4 KG/M2

## 2025-03-13 DIAGNOSIS — E28.2 POLYCYSTIC OVARIAN SYNDROME: ICD-10-CM

## 2025-03-13 DIAGNOSIS — N95.2 VAGINAL ATROPHY: ICD-10-CM

## 2025-03-13 DIAGNOSIS — N92.6 IRREGULAR MENSES: Primary | ICD-10-CM

## 2025-03-13 DIAGNOSIS — N84.0 ENDOMETRIAL POLYP: ICD-10-CM

## 2025-03-13 DIAGNOSIS — Z78.0 MENOPAUSE: ICD-10-CM

## 2025-03-13 LAB
ESTRADIOL SERPL-MCNC: 29.8 PG/ML
FSH SERPL-ACNC: 11.6 MIU/ML
LH SERPL-ACNC: 25 MIU/ML

## 2025-03-13 PROCEDURE — 82670 ASSAY OF TOTAL ESTRADIOL: CPT

## 2025-03-13 PROCEDURE — 83002 ASSAY OF GONADOTROPIN (LH): CPT

## 2025-03-13 PROCEDURE — 36415 COLL VENOUS BLD VENIPUNCTURE: CPT

## 2025-03-13 PROCEDURE — 83001 ASSAY OF GONADOTROPIN (FSH): CPT

## 2025-03-13 PROCEDURE — 99213 OFFICE O/P EST LOW 20 MIN: CPT | Performed by: OBSTETRICS & GYNECOLOGY

## 2025-03-13 RX ORDER — METFORMIN HYDROCHLORIDE 500 MG/1
500 TABLET, EXTENDED RELEASE ORAL 2 TIMES DAILY WITH MEALS
COMMUNITY
Start: 2025-03-03

## 2025-03-13 NOTE — PROGRESS NOTES
Assessment & Plan   Diagnoses and all orders for this visit:    Irregular menses    Polycystic ovarian syndrome    Vaginal atrophy    Endometrial polyp    Other orders  -     metFORMIN (GLUCOPHAGE-XR) 500 mg 24 hr tablet; Take 500 mg by mouth 2 (two) times a day with meals    1. episode of vaginal bleeding-noted September 2024.  She had 1 day of bleeding at that time with prompt resolution.  She was on the minipill at that time, so it is unclear whether the bleeding was related to just scant spotting from minipill use versus possible postmenopausal bleeding.  We had a long discussion about the findings of this.  She has had no further bleeding over these past 6 months time.  Given her history of endometrial polyps multiple times in the past, she is interested in more aggressive evaluation.  To return in the near future for sonohysterogram with endometrial biopsy.  She was counseled about the test and we will proceed accordingly.  She will take ibuprofen or naproxen prior to it.  2.  History of endometrial polyp-reviewing her chart, has had endometrial polyp noted from 12/14 endometrial biopsy, 5/17, and 3/23.  To proceed with testing as noted above.  3.  Unclear hormonal status-had FSH of 6.7 with estradiol of 19 from 3/23.  She stopped minipill at visit on 12/4/2024 and has had no bleeding since then.  She did get FSH, LH, and estradiol this morning, the results are still pending.  We will assess the findings and proceed accordingly.  4. contraception-stopped minipill 12/4/2024 as noted.  Would recommend condoms until menopausal status is clarified  5.  History of diabetes/hypertension-follow-up as per primary provider.  She has appointment on 3/17/2025.  6. mild vaginal atrophy  7.  Elevated BMI  8.  History of yeast/external hemorrhoids  9. other-possible menopausal symptoms.  She has noted sleep issues and more anger.  She denies any significant night sweats or hot flushes however.  To check labs as noted above.   She will review this with Dr. Bojorquez and we will proceed accordingly.  Follow-up near future for sonohysterogram and endometrial biopsy or as needed.    Subjective   Patient ID: Jaimie Goodwin is a 54 y.o. female.    Vitals:    25 0809   BP: 110/74     Patient was seen today for follow-up visit.  Please see assessment plan for details.      The following portions of the patient's history were reviewed and updated as appropriate: allergies, current medications, past family history, past medical history, past social history, past surgical history, and problem list.  Past Medical History:   Diagnosis Date    Abnormal Pap smear of cervix     Anemia     Arthritis     Decreased libido     Diabetes mellitus (HCC)     Female infertility     HPV (human papilloma virus) infection     Hypertension     Ovarian cyst     Polycystic ovarian syndrome     Urinary tract infection     Uterine leiomyoma     resolved 2017     Past Surgical History:   Procedure Laterality Date     SECTION      DILATION AND CURETTAGE OF UTERUS      ENDOMETRIAL BIOPSY      Onset  by suction    EXAMINATION UNDER ANESTHESIA N/A 3/28/2023    Procedure: (EUA);  Surgeon: Brad Orozco MD;  [facility];  Service: Gynecology    LAPAROSCOPIC GASTRIC BANDING      DE HYSTEROSCOPY BX ENDOMETRIUM&/POLYPC W/WO D&C N/A 5/3/2017    Procedure: HYSTEROSCOPY; D&C; ENDOMETRIAL POLYPECTOMY ;  Surgeon: Brad Orozco MD;  [facility];  Service: Gynecology    DE HYSTEROSCOPY BX ENDOMETRIUM&/POLYPC W/WO D&C N/A 3/28/2023    Procedure: (D&C) W/ HYSTEROSCOPY;  Surgeon: Brad Orozco MD;  [facility];  Service: Gynecology    DE HYSTEROSCOPY BX ENDOMETRIUM&/POLYPC W/WO D&C N/A 3/28/2023    Procedure: Endometrial Polypectomy;  Surgeon: Brad Orozco MD;  [facility];  Service: Gynecology     OB History    Para Term  AB Living   1 1 1   1   SAB IAB Ectopic Multiple Live Births       1      # Outcome Date  GA Lbr Jorje/2nd Weight Sex Type Anes PTL Lv   1 Term                Current Outpatient Medications:     Aspirin 81 MG CAPS, Take by mouth, Disp: , Rfl:     atorvastatin (LIPITOR) 10 mg tablet, Take 1 tablet by mouth every evening, Disp: , Rfl:     cetirizine (ZyrTEC) 10 mg tablet, Take by mouth, Disp: , Rfl:     chlorthalidone 25 mg tablet, Take 25 mg by mouth daily, Disp: , Rfl:     Cholecalciferol (VITAMIN D-3 PO), Take by mouth, Disp: , Rfl:     lisinopril (ZESTRIL) 20 mg tablet, Take by mouth, Disp: , Rfl:     losartan (COZAAR) 100 MG tablet, Take 100 mg by mouth daily, Disp: , Rfl:     losartan (COZAAR) 50 mg tablet, Take 100 mg by mouth daily  , Disp: , Rfl:     meloxicam (MOBIC) 15 mg tablet, Take 15 mg by mouth daily, Disp: , Rfl:     metFORMIN (GLUCOPHAGE) 500 mg tablet, Take 500 mg by mouth 2 (two) times a day with meals, Disp: , Rfl:     metFORMIN (GLUCOPHAGE-XR) 500 mg 24 hr tablet, Take 500 mg by mouth 2 (two) times a day with meals, Disp: , Rfl:     norethindrone (MICRONOR) 0.35 MG tablet, Take 1 tablet (0.35 mg total) by mouth daily, Disp: 84 tablet, Rfl: 0    sertraline (ZOLOFT) 50 mg tablet, Take 50 mg by mouth daily, Disp: , Rfl:     Zepbound 10 MG/0.5ML auto-injector, INJECT 10 MG UNDER THE SKIN EVERY 7 DAYS, Disp: , Rfl:     LORazepam (ATIVAN) 0.5 mg tablet, Take 0.5 mg by mouth every 6 (six) hours as needed for anxiety, Disp: , Rfl:     sitaGLIPtin (JANUVIA) 100 mg tablet, Take 100 mg by mouth in the morning. (Patient not taking: Reported on 3/13/2025), Disp: , Rfl:   No Known Allergies  Social History     Socioeconomic History    Marital status: /Civil Union     Spouse name: None    Number of children: 1    Years of education: None    Highest education level: None   Occupational History    None   Tobacco Use    Smoking status: Never    Smokeless tobacco: Never   Vaping Use    Vaping status: Never Used   Substance and Sexual Activity    Alcohol use: Yes     Alcohol/week: 2.0 standard  drinks of alcohol     Types: 2 Glasses of wine per week    Drug use: No    Sexual activity: Yes     Partners: Male     Comment: oral contraceptive prescribe last assessed 05/16/2017   Other Topics Concern    None   Social History Narrative    Always uses seat belt    Caffeine use    [employer] affiliation     Social Drivers of Health     Financial Resource Strain: Low Risk  (5/8/2024)    Received from [facility]    Overall Financial Resource Strain (CARDIA)     Difficulty of Paying Living Expenses: Not hard at all   Food Insecurity: No Food Insecurity (5/8/2024)    Received from [facility]    Hunger Vital Sign     Worried About Running Out of Food in the Last Year: Never true     Ran Out of Food in the Last Year: Never true   Transportation Needs: No Transportation Needs (5/8/2024)    Received from [facility]    PRAPARE - Transportation     Lack of Transportation (Medical): No     Lack of Transportation (Non-Medical): No   Physical Activity: Not on file   Stress: Not on file   Social Connections: Not on file   Intimate Partner Violence: Not At Risk (5/8/2024)    [facility]    Humiliation, Afraid, Rape, and Kick questionnaire     Fear of Current or Ex-Partner: No     Emotionally Abused: No     Physically Abused: No     Sexually Abused: No   Housing Stability: Low Risk  (5/8/2024)    Received from [facility]    Housing Stability Vital Sign     Unable to Pay for Housing in the Last Year: No     Number of Times Moved in the Last Year: 1     Homeless in the Last Year: No     Family History   Problem Relation Age of Onset    Diabetes Mother     Hypertension Mother     Diabetes Father     Heart disease Father     Heart attack Paternal Grandmother     Heart attack Paternal Grandfather     Heart disease Other     Heart disease Other     No Known Problems Maternal Grandmother     No Known  Problems Sister     No Known Problems Maternal Aunt     No Known Problems Maternal Aunt     No Known Problems Maternal Aunt     No Known Problems Maternal Aunt     No Known Problems Maternal Aunt     No Known Problems Maternal Aunt     Ovarian cancer Paternal Aunt 40    No Known Problems Paternal Aunt     No Known Problems Paternal Aunt     No Known Problems Paternal Aunt     No Known Problems Paternal Aunt     No Known Problems Paternal Aunt     No Known Problems Sister     No Known Problems Maternal Grandfather        Review of Systems   Constitutional:  Negative for chills, diaphoresis, fatigue and fever.   Respiratory:  Negative for apnea, cough, chest tightness, shortness of breath and wheezing.    Cardiovascular:  Negative for chest pain, palpitations and leg swelling.   Gastrointestinal:  Negative for abdominal distention, abdominal pain, anal bleeding, constipation, diarrhea, nausea, rectal pain and vomiting.   Genitourinary:  Negative for difficulty urinating, dyspareunia, dysuria, frequency, hematuria, menstrual problem, pelvic pain, urgency, vaginal bleeding, vaginal discharge and vaginal pain.   Musculoskeletal:  Negative for arthralgias, back pain and myalgias.   Skin:  Negative for color change and rash.   Neurological:  Negative for dizziness, syncope, light-headedness, numbness and headaches.   Hematological:  Negative for adenopathy. Does not bruise/bleed easily.   Psychiatric/Behavioral:  Negative for dysphoric mood and sleep disturbance. The patient is not nervous/anxious.        Objective   Physical Exam  OBGyn Exam     Objective      /74 (BP Location: Left arm, Patient Position: Sitting)   Wt 104 kg (230 lb)   BMI 41.40 kg/m²     General:   alert and oriented, in no acute distress   Neck:    Breast:    Heart:    Lungs:    Abdomen: Nontender   Vulva:    Vagina:    Cervix:    Uterus:    Adnexa:    Rectum:     Psych:  Normal mood and affect   Skin:  Without obvious lesions   Eyes:  symmetric, with normal movements and reactivity   Musculoskeletal:  Normal muscle tone and movements appreciated

## 2025-03-14 ENCOUNTER — RESULTS FOLLOW-UP (OUTPATIENT)
Dept: GYNECOLOGY | Facility: CLINIC | Age: 55
End: 2025-03-14

## 2025-03-14 NOTE — TELEPHONE ENCOUNTER
LM on VM to [facility] - need to discuss hormonal blood work which continues to show some ovarian function requiring continued BC if sexually active. Pt can be placed back on POP or use condoms. Would like to recheck blood work in 6 months.

## 2025-03-31 DIAGNOSIS — N95.1 MENOPAUSAL SYMPTOMS: Primary | ICD-10-CM

## 2025-03-31 DIAGNOSIS — Z30.41 ENCOUNTER FOR SURVEILLANCE OF CONTRACEPTIVE PILLS: ICD-10-CM

## 2025-03-31 RX ORDER — ACETAMINOPHEN AND CODEINE PHOSPHATE 120; 12 MG/5ML; MG/5ML
1 SOLUTION ORAL DAILY
Qty: 84 TABLET | Refills: 1 | Status: SHIPPED | OUTPATIENT
Start: 2025-03-31

## 2025-04-15 NOTE — PROGRESS NOTES
AMB US Pelvic Non OB    Date/Time: 4/17/2025 9:30 AM    Performed by: Lee Ann Ziegler  Authorized by: Brad Orozco MD  Universal Protocol:  Consent: Verbal consent obtained.  Consent given by: patient  Timeout called at: 4/17/2025 9:16 AM.  Patient understanding: patient states understanding of the procedure being performed  Patient identity confirmed: verbally with patient    Procedure details:     SIS Procedure: Yes    Indications: non-obstetric vaginal bleeding      Technique:  Transvaginal US, Non-OB    Position: lithotomy exam    Uterine findings:     Length (cm): 10.74    Height (cm):  3.97    Width (cm):  4.43    Endometrial stripe: identified      Endometrium thickness (mm):  3.2  Left ovary findings:     Left ovary:  Visualized    Length (cm): 3.67    Height (cm): 2.15    Width (cm): 2.32  Right ovary findings:     Right ovary:  Visualized    Length (cm): 3.1    Height (cm): 1.76    Width (cm): 1.84  Other findings:     Free pelvic fluid: not identified      Free peritoneal fluid: not identified    Post-Procedure Details:     Impression:  Anteflexed uterus is inhomogeneous throughout without distinct fibroids. The bilateral ovaries appear within normal limits where seen.  No free fluid. Very limited exam due to tissue attenuation.     Tolerance:  Tolerated well, no immediate complications    Complications: no complications    Additional Procedure Comments:      Transabdominal ultrasound performed, however there is no visualization on today's exam likely due to scar tissue from recent surgical procedure.     [employer] F8 E8C-RS transvaginal transducer Serial # [id number] was used to perform the examination today and subsequently followed with high level disinfection utilizing Trophon EPR procedure.     Ultrasound performed at:     [facility]  [facility] [address] [address]  Phone:  [phone]  Fax:  [phone]      Sonohysterogram    Date/Time: 4/17/2025 9:30 AM    Performed by: Brad  MD Pam  Authorized by: Brad Orozco MD  Universal Protocol:  Consent given by: patient  Timeout called at: 4/17/2025 9:16 AM.  Patient understanding: patient states understanding of the procedure being performed  Patient identity confirmed: verbally with patient    Pre-procedure:     Prepped with: Betadine    Procedure:     Cervix cleaned and prepped: yes      Tenaculum applied to cervix: yes      Uterus sounded: yes      Catheter inserted: yes      Uterine cavity distended with saline: yes    Post-procedure:     Patient observed: yes      Post procedure instructions given to patient: yes      Patient tolerated procedure well with no complications: yes    Comments:      Sonohysterogram demonstrates a possible filling defect within the endometrium.   Endometrial biopsy    Date/Time: 4/17/2025 9:30 AM    Performed by: Brad Orozco MD  Authorized by: Brad Orozco MD  Universal Protocol:  Consent: Verbal consent obtained.  Consent given by: patient  Timeout called at: 4/17/2025 9:16 AM.  Patient understanding: patient states understanding of the procedure being performed  Patient consent: the patient's understanding of the procedure matches consent given  Patient identity confirmed: verbally with patient    Indication:     Indications: Other disorder of menstruation and other abnormal bleeding from female genital tract    Procedure:     Procedure: endometrial biopsy with Pipelle      A bivalve speculum was placed in the vagina: yes      Cervix cleaned and prepped: yes      Specimen collected: specimen collected and sent to pathology      Patient tolerated procedure well with no complications: yes

## 2025-04-17 ENCOUNTER — PROCEDURE VISIT (OUTPATIENT)
Dept: GYNECOLOGY | Facility: CLINIC | Age: 55
End: 2025-04-17
Payer: COMMERCIAL

## 2025-04-17 ENCOUNTER — ULTRASOUND (OUTPATIENT)
Dept: GYNECOLOGY | Facility: CLINIC | Age: 55
End: 2025-04-17
Payer: COMMERCIAL

## 2025-04-17 ENCOUNTER — TELEPHONE (OUTPATIENT)
Dept: GYNECOLOGY | Facility: CLINIC | Age: 55
End: 2025-04-17

## 2025-04-17 DIAGNOSIS — N93.9 ABNORMAL UTERINE BLEEDING (AUB): Primary | ICD-10-CM

## 2025-04-17 DIAGNOSIS — N92.6 IRREGULAR MENSES: Primary | ICD-10-CM

## 2025-04-17 DIAGNOSIS — N95.2 VAGINAL ATROPHY: ICD-10-CM

## 2025-04-17 DIAGNOSIS — N84.0 ENDOMETRIAL POLYP: ICD-10-CM

## 2025-04-17 PROCEDURE — 58340 CATHETER FOR HYSTEROGRAPHY: CPT | Performed by: OBSTETRICS & GYNECOLOGY

## 2025-04-17 PROCEDURE — 76831 ECHO EXAM UTERUS: CPT | Performed by: OBSTETRICS & GYNECOLOGY

## 2025-04-17 PROCEDURE — 88305 TISSUE EXAM BY PATHOLOGIST: CPT | Performed by: PATHOLOGY

## 2025-04-17 PROCEDURE — 99214 OFFICE O/P EST MOD 30 MIN: CPT | Performed by: OBSTETRICS & GYNECOLOGY

## 2025-04-17 PROCEDURE — 58100 BIOPSY OF UTERUS LINING: CPT | Performed by: OBSTETRICS & GYNECOLOGY

## 2025-04-17 NOTE — PROGRESS NOTES
Assessment & Plan   Diagnoses and all orders for this visit:    Irregular menses    Endometrial polyp    Vaginal atrophy    1.  Episode of irregular bleeding/endometrial polyp-noted September 2024.  She had had amenorrhea for many months on the minipill at that time.  Sonohysterogram today demonstrates focal finding at the fundus, likely representing polyp versus possible myoma versus possible scar tissue.  Endometrial biopsy was done and we will inform her of the findings.  Counseled about options for care.  Given the finding, she is interested in proceeding with hysteroscopy with D&C with endometrial polypectomy, possible resection of submucous fibroid if found.  Risk and benefits of the procedure were discussed in detail and all questions were answered.  Consent was signed, the patient was scheduled for the surgery in the near future.  2. history of endometrial polyps-  Endometrial biopsy 12/14-noted for polyp.  Had 2/15 D&C with no polyp found  5/17 D&C with endometrial polyp  3/23 D&C with endometrial polyp  3. unclear hormonal status-had FSH of 11.6, LH 25 and estradiol of 29.8.  The LH and the estradiol are suggestive of possible menopause, FSH is still low.  Likely, she is in perimenopause.  4. contraception-stopped minipill 12/4/2024.  She does plan to stay off of it.  She has had no bleeding since stopping this medication.  5. history of diabetes/hypertension-follow-up as per treating doctor  6. mild vaginal atrophy  7.  Elevated BMI  8.  History of yeast/hemorrhoids-denies current concerns  9. other-reviewed medications.  Suggested hold aspirin, Mobic, and Zepbound, hold Zepbound greater than 7 days prior to surgery.  To follow-up 2 weeks post procedure.    Subjective   Patient ID: Jaimie Goodwin is a 54 y.o. female.    There were no vitals filed for this visit.  HPI    The following portions of the patient's history were reviewed and updated as appropriate: allergies, current medications, past  family history, past medical history, past social history, past surgical history, and problem list.  Past Medical History:   Diagnosis Date    Abnormal Pap smear of cervix     Anemia     Arthritis     Decreased libido     Diabetes mellitus (HCC)     Female infertility     HPV (human papilloma virus) infection     Hypertension     Ovarian cyst     Polycystic ovarian syndrome     Urinary tract infection     Uterine leiomyoma     resolved 2017     Past Surgical History:   Procedure Laterality Date     SECTION      DILATION AND CURETTAGE OF UTERUS      ENDOMETRIAL BIOPSY      Onset  by suction    EXAMINATION UNDER ANESTHESIA N/A 3/28/2023    Procedure: (EUA);  Surgeon: Brad Orozco MD;  [facility];  Service: Gynecology    LAPAROSCOPIC GASTRIC BANDING      MT HYSTEROSCOPY BX ENDOMETRIUM&/POLYPC W/WO D&C N/A 5/3/2017    Procedure: HYSTEROSCOPY; D&C; ENDOMETRIAL POLYPECTOMY ;  Surgeon: Brad Orozco MD;  [facility];  Service: Gynecology    MT HYSTEROSCOPY BX ENDOMETRIUM&/POLYPC W/WO D&C N/A 3/28/2023    Procedure: (D&C) W/ HYSTEROSCOPY;  Surgeon: Brad Orozco MD;  [facility];  Service: Gynecology    MT HYSTEROSCOPY BX ENDOMETRIUM&/POLYPC W/WO D&C N/A 3/28/2023    Procedure: Endometrial Polypectomy;  Surgeon: Brad Orozco MD;  [facility];  Service: Gynecology     OB History    Para Term  AB Living   1 1 1   1   SAB IAB Ectopic Multiple Live Births       1      # Outcome Date GA Lbr Jorje/2nd Weight Sex Type Anes PTL Lv   1 Term                Current Outpatient Medications:     Aspirin 81 MG CAPS, Take by mouth, Disp: , Rfl:     atorvastatin (LIPITOR) 10 mg tablet, Take 1 tablet by mouth every evening, Disp: , Rfl:     cetirizine (ZyrTEC) 10 mg tablet, Take by mouth, Disp: , Rfl:     chlorthalidone 25 mg tablet, Take 25 mg by mouth daily, Disp: , Rfl:     Cholecalciferol (VITAMIN D-3 PO), Take by mouth, Disp: , Rfl:     lisinopril (ZESTRIL) 20 mg  tablet, Take by mouth, Disp: , Rfl:     LORazepam (ATIVAN) 0.5 mg tablet, Take 0.5 mg by mouth every 6 (six) hours as needed for anxiety, Disp: , Rfl:     losartan (COZAAR) 100 MG tablet, Take 100 mg by mouth daily, Disp: , Rfl:     losartan (COZAAR) 50 mg tablet, Take 100 mg by mouth daily  , Disp: , Rfl:     meloxicam (MOBIC) 15 mg tablet, Take 15 mg by mouth daily, Disp: , Rfl:     metFORMIN (GLUCOPHAGE) 500 mg tablet, Take 500 mg by mouth 2 (two) times a day with meals, Disp: , Rfl:     metFORMIN (GLUCOPHAGE-XR) 500 mg 24 hr tablet, Take 500 mg by mouth 2 (two) times a day with meals, Disp: , Rfl:     norethindrone (MICRONOR) 0.35 MG tablet, Take 1 tablet (0.35 mg total) by mouth daily, Disp: 84 tablet, Rfl: 1    sertraline (ZOLOFT) 50 mg tablet, Take 50 mg by mouth daily, Disp: , Rfl:     sitaGLIPtin (JANUVIA) 100 mg tablet, Take 100 mg by mouth in the morning. (Patient not taking: Reported on 3/13/2025), Disp: , Rfl:     Zepbound 10 MG/0.5ML auto-injector, INJECT 10 MG UNDER THE SKIN EVERY 7 DAYS, Disp: , Rfl:   No Known Allergies  Social History     Socioeconomic History    Marital status: /Civil Union     Spouse name: Not on file    Number of children: 1    Years of education: Not on file    Highest education level: Not on file   Occupational History    Not on file   Tobacco Use    Smoking status: Never    Smokeless tobacco: Never   Vaping Use    Vaping status: Never Used   Substance and Sexual Activity    Alcohol use: Yes     Alcohol/week: 2.0 standard drinks of alcohol     Types: 2 Glasses of wine per week    Drug use: No    Sexual activity: Yes     Partners: Male     Comment: oral contraceptive prescribe last assessed 05/16/2017   Other Topics Concern    Not on file   Social History Narrative    Always uses seat belt    Caffeine use    [employer] affiliation     Social Drivers of Health     Financial Resource Strain: Low Risk  (5/8/2024)    Received from [facility]    Overall  Financial Resource Strain (CARDIA)     Difficulty of Paying Living Expenses: Not hard at all   Food Insecurity: No Food Insecurity (5/8/2024)    Received from [facility]    Hunger Vital Sign     Worried About Running Out of Food in the Last Year: Never true     Ran Out of Food in the Last Year: Never true   Transportation Needs: No Transportation Needs (5/8/2024)    Received from [facility]    PRAPARE - Transportation     Lack of Transportation (Medical): No     Lack of Transportation (Non-Medical): No   Physical Activity: Not on file   Stress: Not on file   Social Connections: Not on file   Intimate Partner Violence: Not At Risk (5/8/2024)    [facility], [facility]    Humiliation, Afraid, Rape, and Kick questionnaire     Fear of Current or Ex-Partner: No     Emotionally Abused: No     Physically Abused: No     Sexually Abused: No   Housing Stability: Low Risk  (5/8/2024)    Received from [facility]    Housing Stability Vital Sign     Unable to Pay for Housing in the Last Year: No     Number of Times Moved in the Last Year: 1     Homeless in the Last Year: No     Family History   Problem Relation Age of Onset    Diabetes Mother     Hypertension Mother     Diabetes Father     Heart disease Father     Heart attack Paternal Grandmother     Heart attack Paternal Grandfather     Heart disease Other     Heart disease Other     No Known Problems Maternal Grandmother     No Known Problems Sister     No Known Problems Maternal Aunt     No Known Problems Maternal Aunt     No Known Problems Maternal Aunt     No Known Problems Maternal Aunt     No Known Problems Maternal Aunt     No Known Problems Maternal Aunt     Ovarian cancer Paternal Aunt 40    No Known Problems Paternal Aunt     No Known Problems Paternal Aunt     No Known Problems Paternal Aunt     No Known Problems Paternal Aunt     No Known Problems Paternal Aunt      No Known Problems Sister     No Known Problems Maternal Grandfather        Review of Systems    Objective   Physical Exam  OBGyn Exam     Objective      There were no vitals taken for this visit.    General:   alert and oriented, in no acute distress   Neck:    Breast:    Heart: regular rate and rhythm, S1, S2 normal, no murmur, click, rub or gallop   Lungs: clear to auscultation bilaterally   Abdomen: soft, non-tender, without masses or organomegaly   Vulva: normal   Vagina: Mildly atrophic, without erythema or lesions or discharge.   Cervix: Mildly atrophic, without lesions or discharge or cervicitis.   Uterus: top normal size, anteverted, non-tender   Adnexa: no mass, fullness, tenderness   Rectum: deferred    Psych:  Normal mood and affect   Skin:  Without obvious lesions   Eyes: symmetric, with normal movements and reactivity   Musculoskeletal:  Normal muscle tone and movements appreciated

## 2025-04-17 NOTE — TELEPHONE ENCOUNTER
----- Message from Brad Orozco MD sent at 2025 10:34 AM EDT -----  Needs fingerstick glucose on the morning of surgery.  ----- Message -----  From: Brad Orozco MD  Sent: 2025  10:33 AM EDT  To: Linette Lay; Brad Orozco MD    [facility] Department  Surgery Scheduling Sheet    Patient Name: Jaimie Goodwin  : 1970    Provider: Brad Orozco MD     Needed: no; Language: N/A    Procedure: exam under anesthesia, dilation and curettage , and hysteroscopy, endometrial polypectomy, possible resection of submucous fibroid    Diagnosis: Irregular menses, endometrial polyp    Special Needs or Equipment: Possible Symphion use    Anesthesia: General anesthesia    Length of stay: outpatient  Does patient have comorbid conditions that will require close perioperative monitoring prior to safe discharge: no    The patient has comorbid conditions that will require close perioperative monitoring prior to safe discharge, including N/A.   This may require acute care beyond the usual and routine recovery period. As such, inpatient admission post-operatively is expected and appropriate, and anticipated hospital length of stay will be >2 midnights.    Pre-Admission Testing Needed: yes   Labs that should be ordered: cbc, cmp, and urine pregnancy test    Order PAT that is recommended in prep for procedure?: Yes    Medical Clearance Needed: no; Provider: N/A    MA Form Signed (tubals/hysterectomy): Not Indicated    Surgical Drink Given: no     How many days out of work: 1 day(s)     How many days no drivin day(s)       Is pre op appt needed?  no  Interval for post op appt: 2 week(s)       For Surgical Scheduler:     Surgery Scheduled On:  [place]: [facility]    Pre-op Appt:   Post op Appt:  Consult/Medical clearance appt:

## 2025-04-17 NOTE — PATIENT INSTRUCTIONS
No outpatient medications have been marked as taking for the 4/17/25 encounter (Procedure visit) with Brad Orozco MD.

## 2025-04-21 PROCEDURE — 88305 TISSUE EXAM BY PATHOLOGIST: CPT | Performed by: PATHOLOGY

## 2025-04-22 ENCOUNTER — RESULTS FOLLOW-UP (OUTPATIENT)
Dept: GYNECOLOGY | Facility: CLINIC | Age: 55
End: 2025-04-22

## 2025-04-22 NOTE — RESULT ENCOUNTER NOTE
Endometrial biopsy is benign with limited tissue noted.  To proceed with hysteroscopy with D&C with possible polypectomy as scheduled in the near future.

## 2025-07-31 ENCOUNTER — TELEPHONE (OUTPATIENT)
Dept: OBGYN CLINIC | Facility: MEDICAL CENTER | Age: 55
End: 2025-07-31

## (undated) DEVICE — SCD SEQUENTIAL COMPRESSION COMFORT SLEEVE MEDIUM KNEE LENGTH: Brand: KENDALL SCD

## (undated) DEVICE — IV EXTENSION TUBING 33 IN

## (undated) DEVICE — GLOVE PI ULTRA TOUCH SZ.8.0

## (undated) DEVICE — PVC URETHRAL CATHETER: Brand: DOVER

## (undated) DEVICE — CYSTO TUBING SINGLE IRRIGATION

## (undated) DEVICE — PREMIUM DRY TRAY LF: Brand: MEDLINE INDUSTRIES, INC.

## (undated) DEVICE — STRL ALLENTOWN HYSTEROSCOPY PK: Brand: CARDINAL HEALTH

## (undated) DEVICE — GLOVE SRG BIOGEL 7.5

## (undated) DEVICE — BETHLEHEM UNIVERSAL MINOR VAG: Brand: CARDINAL HEALTH

## (undated) DEVICE — GLOVE INDICATOR PI UNDERGLOVE SZ 8 BLUE

## (undated) DEVICE — STRAIGHT CATH RED RUBBER 12FR

## (undated) DEVICE — GLOVE SRG BIOGEL 8

## (undated) DEVICE — GLOVE INDICATOR PI UNDERGLOVE SZ 7.5 BLUE

## (undated) DEVICE — SURGICAL GOWN, XL SMARTSLEEVE: Brand: CONVERTORS

## (undated) DEVICE — 2000CC GUARDIAN II: Brand: GUARDIAN

## (undated) DEVICE — LIGHT GLOVE GREEN

## (undated) DEVICE — TISSUE REMOVAL SYSTEM RESECTING DEVICE: Brand: SYMPHION

## (undated) DEVICE — TISSUE REMOVAL SYSTEM FLUID MANAGEMENT ACCESSORIES: Brand: SYMPHION

## (undated) DEVICE — UNDER BUTTOCKS DRAPE W/FLUID CONTROL POUCH: Brand: CONVERTORS

## (undated) DEVICE — GLOVE SRG BIOGEL 7

## (undated) DEVICE — ENDOMETRIAL BX PIPELLE